# Patient Record
Sex: MALE | Race: WHITE | Employment: FULL TIME | URBAN - METROPOLITAN AREA
[De-identification: names, ages, dates, MRNs, and addresses within clinical notes are randomized per-mention and may not be internally consistent; named-entity substitution may affect disease eponyms.]

---

## 2017-06-13 ENCOUNTER — HOSPITAL ENCOUNTER (EMERGENCY)
Facility: CLINIC | Age: 52
Discharge: HOME | End: 2017-06-13
Attending: PHYSICIAN ASSISTANT | Admitting: PHYSICIAN ASSISTANT
Payer: COMMERCIAL

## 2017-06-13 VITALS
HEART RATE: 80 BPM | OXYGEN SATURATION: 98 % | BODY MASS INDEX: 32.98 KG/M2 | TEMPERATURE: 97.6 F | RESPIRATION RATE: 16 BRPM | SYSTOLIC BLOOD PRESSURE: 117 MMHG | DIASTOLIC BLOOD PRESSURE: 99 MMHG | WEIGHT: 250 LBS

## 2017-06-13 DIAGNOSIS — G47.9 DIFFICULTY SLEEPING: ICD-10-CM

## 2017-06-13 PROCEDURE — 99282 EMERGENCY DEPT VISIT SF MDM: CPT | Performed by: PHYSICIAN ASSISTANT

## 2017-06-13 PROCEDURE — 99283 EMERGENCY DEPT VISIT LOW MDM: CPT | Mod: Z6 | Performed by: PHYSICIAN ASSISTANT

## 2017-06-13 ASSESSMENT — ENCOUNTER SYMPTOMS
HEADACHES: 0
FEVER: 0
CHILLS: 0
NERVOUS/ANXIOUS: 0
DYSPHORIC MOOD: 0
COUGH: 0
SEIZURES: 1
SHORTNESS OF BREATH: 0
SLEEP DISTURBANCE: 1

## 2017-06-13 NOTE — ED AVS SNAPSHOT
[facility] Emergency Department    [address] [place] [address]    Phone:  [phone]    Fax:  [phone]                                       Bijan Livingston   MRN: [id number]    Department:  [facility] Emergency Department   Date of Visit:  6/13/2017           After Visit Summary Signature Page     I have received my discharge instructions, and my questions have been answered. I have discussed any challenges I see with this plan with the nurse or doctor.    ..........................................................................................................................................  Patient/Patient Representative Signature      ..........................................................................................................................................  Patient Representative Print Name and Relationship to Patient    ..................................................               ................................................  Date                                            Time    ..........................................................................................................................................  Reviewed by Signature/Title    ...................................................              ..............................................  Date                                                            Time

## 2017-06-13 NOTE — ED AVS SNAPSHOT
[facility] Emergency Department    [address] [place] [address]    Phone:  [phone]    Fax:  [phone]                                       Bijan Livingston   MRN: [id number]    Department:  [facility] Emergency Department   Date of Visit:  6/13/2017           Patient Information     Date Of Birth          1965        Your diagnoses for this visit were:     Difficulty sleeping        You were seen by Claudia Livingston PA-C.      Follow-up Information     Follow up with [facility] Emergency Department.    Specialty:  EMERGENCY MEDICINE    Why:  If symptoms worsen    Contact information:    [facility]   [place] [address] [phone]    Additional information:    From [address]: Exit at [address] on south side of [place]. Turn right on [address]. Turn left at stoplight on [address]. [facility] will be in view two blocks ahead        Discharge Instructions       Follow up on Friday with your primary care provider as scheduled to discuss your difficulty sleeping. Try the doxylamine for the next couple nights and see if that helps you sleep. Return to the ED as discussed for worsening symptoms.    Thank you for choosing [facility]'s Emergency Department. It was a pleasure taking care of you today. If you have any questions, please call [phone].    Claudia Livingston PA-C      Discharge References/Attachments     INSOMNIA (ENGLISH)      24 Hour Appointment Hotline       To make an appointment at any [place] clinic, call [phone] ([phone]). If you don't have a family doctor or clinic, we will help you find one. [place] clinics are conveniently located to serve the needs of you and your family.             Review of your medicines      START taking        Dose / Directions Last dose taken    doxylamine 25 MG Tabs tablet   Commonly known as:  UNISOM   Dose:  25 mg   Quantity:  14 each        Take 1  tablet (25 mg) by mouth At Bedtime   Refills:  0          Our records show that you are taking the medicines listed below. If these are incorrect, please call your family doctor or clinic.        Dose / Directions Last dose taken    DILANTIN 100 MG CR capsule   Generic drug:  phenytoin        5 caps every am   Refills:  0                Prescriptions were sent or printed at these locations (1 Prescription)                   [facility]   [address] [address]    Telephone:  [phone]   Fax:  [phone]   Hours:                  These medications are not ready yet because we are checking if your insurance will help you pay for them. Call your pharmacy to confirm that your medication is ready for pickup. It may take up to 24 hours for them to receive the prescription. If the prescription is not ready within 3 business days, please contact your clinic or your provider (1 of 1)         doxylamine (UNISOM) 25 MG TABS tablet                Orders Needing Specimen Collection     None      Pending Results     No orders found from 6/11/2017 to 6/14/2017.            Pending Culture Results     No orders found from 6/11/2017 to 6/14/2017.            Pending Results Instructions     If you had any lab results that were not finalized at the time of your Discharge, you can call the ED Lab Result RN at [phone]. You will be contacted by this team for any positive Lab results or changes in treatment. The nurses are available 7 days a week from 10A to 6:30P.  You can leave a message 24 hours per day and they will return your call.        Thank you for choosing [facility]       Thank you for choosing [facility] for your care. Our goal is always to provide you with excellent care. Hearing back from our patients is one way we can continue to improve our services. Please take a few minutes to complete the written survey that you may receive in the mail after you visit with us. Thank  "you!        [facility] Information     [employer] lets you send messages to your doctor, view your test results, renew your prescriptions, schedule appointments and more. To sign up, go to [email or web address] . Click on \"Log in\" on the left side of the screen, which will take you to the Welcome page. Then click on \"Sign up Now\" on the right side of the page.     You will be asked to enter the access code listed below, as well as some personal information. Please follow the directions to create your username and password.     Your access code is: [email or web address]  Expires: 2017 10:19 PM     Your access code will  in 90 days. If you need help or a new code, please call your [facility] clinic or [phone].        Care EveryWhere ID     This is your Care EveryWhere ID. This could be used by other organizations to access your [facility] medical records  [id number]        After Visit Summary       This is your record. Keep this with you and show to your community pharmacist(s) and doctor(s) at your next visit.                  "

## 2017-06-14 NOTE — ED PROVIDER NOTES
History     Chief Complaint   Patient presents with     Insomnia     HPI  Bijan Livingston is a 51 year old male who presents to the emergency department complaining of difficulty sleeping.  This has been ongoing for the past week due to the hot weather.  He reports tossing and turning throughout the night but not getting comfortable due to feeling too hot.  He has gotten about 2 hours of sleep per night. He did have 2 nights where he slept okay.  He has tried half tablets of melatonin 3mg which doesn't seem to help anymore. he denies any pain, fever, infectious symptoms.  Does have a lot of his mind related to work but denies any significant anxiety or depression contributing. He is taking Dilantin for seizure history but otherwise no other medications.  Denies drug use or cigarette use. No alcohol use.      I have reviewed the Medications, Allergies, Past Medical and Surgical History, and Social History in the Epic system.    Allergies: No Known Allergies      No current facility-administered medications on file prior to encounter.   Current Outpatient Prescriptions on File Prior to Encounter:  DILANTIN 100 MG OR CAPS 5 caps every am       Patient Active Problem List   Diagnosis     Hyperlipidemia with target LDL less than 130     Obesity     Possible exposure to STD     Non compliance with medical treatment     Snoring     Sleep disorder     Epilepsy (H)     Adjustment disorder with anxious mood       Past Surgical History:   Procedure Laterality Date     LAPAROSCOPIC APPENDECTOMY  8/29/2011    Procedure:LAPAROSCOPIC APPENDECTOMY; Laparoscopic Appendectomy; Surgeon:ALLYN MCCORMICK; Location:[facility] OR     LOBECTOMY LUNG      Right, upper 1/3       Social History   Substance Use Topics     Smoking status: Former Smoker     Packs/day: 0.00     Quit date: 5/29/2010     Smokeless tobacco: Never Used     Alcohol use Yes      Comment: 1-2/month         There is no immunization history on file for this patient.    BMI:  "Estimated body mass index is 32.98 kg/(m^2) as calculated from the following:    Height as of 10/22/15: 1.854 m (6' 1\").    Weight as of this encounter: 113.4 kg (250 lb).      Review of Systems   Constitutional: Negative for chills and fever.   Respiratory: Negative for cough and shortness of breath.    Skin: Negative for rash.   Neurological: Positive for seizures (hx seizures, none for many years). Negative for headaches.   Psychiatric/Behavioral: Positive for sleep disturbance. Negative for dysphoric mood and suicidal ideas. The patient is not nervous/anxious.    All other systems reviewed and are negative.      Physical Exam   BP: (!) 117/99  Pulse: 80  Temp: 97.6  F (36.4  C)  Resp: 16  Weight: 113.4 kg (250 lb)  SpO2: 98 %  Physical Exam   Constitutional: He is oriented to person, place, and time. He appears well-developed and well-nourished. No distress.   HENT:   Head: Normocephalic and atraumatic.   Right Ear: Tympanic membrane normal.   Left Ear: Tympanic membrane normal.   Mouth/Throat: Uvula is midline, oropharynx is clear and moist and mucous membranes are normal.   Eyes: Conjunctivae and EOM are normal. Pupils are equal, round, and reactive to light.   Neck: Normal range of motion.   Cardiovascular: Normal rate, regular rhythm and normal heart sounds.    Pulmonary/Chest: Effort normal and breath sounds normal. No respiratory distress.   Neurological: He is alert and oriented to person, place, and time. No cranial nerve deficit.   Skin: Skin is warm and dry. He is not diaphoretic.   Psychiatric: He has a normal mood and affect.   Nursing note and vitals reviewed.      ED Course     ED Course     Procedures  None     Assessments & Plan (with Medical Decision Making)  Bijan Livingston is a 51 year old male who presented to the emergency department complaining of difficulty sleeping for the past week. He attributes it to the change in the weather. Tried melatonin without significant relief. Denies any " other symptoms contributing. Vitals and exam here unremarkable. I did not feel work up in ED warranted given normal exam. I discussed with him treatment options and he has not tried any over-the-counter sleep aids other than melatonin.  Rather than prescribing something stronger like Ambien or Trazodone this evening I recommended he try Unisom first, to which he was agreeable.  He was prescribed this as most pharmacies are currently closed. I recommended he take this over the next few nights and he already has a follow-up appointment scheduled on Friday with his PCP to discuss his difficulty sleeping further.  If the Unisom is working at that time he could continue it but if not they can look at other options.  He was given instructions on when to return to the emergency department.  All questions were answered and patient was comfortable with this plan and was discharged at this time.       I have reviewed the nursing notes.    I have reviewed the findings, diagnosis, plan and need for follow up with the patient.    Discharge Medication List as of 6/13/2017 10:19 PM      START taking these medications    Details   doxylamine (UNISOM) 25 MG TABS tablet Take 1 tablet (25 mg) by mouth At Bedtime, Disp-14 each, R-0, E-Prescribe             Final diagnoses:   Difficulty sleeping       6/13/2017   [facility] EMERGENCY DEPARTMENT     Claudia Livingtson PA-C  06/13/17 9054

## 2017-06-14 NOTE — DISCHARGE INSTRUCTIONS
Follow up on Friday with your primary care provider as scheduled to discuss your difficulty sleeping. Try the doxylamine for the next couple nights and see if that helps you sleep. Return to the ED as discussed for worsening symptoms.    Thank you for choosing [facility]'s Emergency Department. It was a pleasure taking care of you today. If you have any questions, please call [phone].    Claudia Livingston PA-C

## 2017-06-16 ENCOUNTER — TRANSFERRED RECORDS (OUTPATIENT)
Dept: HEALTH INFORMATION MANAGEMENT | Facility: CLINIC | Age: 52
End: 2017-06-16

## 2017-06-29 ENCOUNTER — TRANSFERRED RECORDS (OUTPATIENT)
Dept: HEALTH INFORMATION MANAGEMENT | Facility: CLINIC | Age: 52
End: 2017-06-29

## 2017-07-03 ENCOUNTER — NURSE TRIAGE (OUTPATIENT)
Dept: NURSING | Facility: CLINIC | Age: 52
End: 2017-07-03

## 2017-07-04 NOTE — TELEPHONE ENCOUNTER
"Bijan prescribed Doxycycline from his PCP at [facility] in [place].  Took first dose at 17:30pm, has a mild headache.  Per information in drug insert packaging, he was advised to call a \"doctor\" for a headache, indicative of increased intracranial pressure.  Advised if headache becomes severe, or other signs including lethargy, seizures, vomiting, vision changes to call back or call prescriber.        Additional Information    Negative: Drug overdose and nurse unable to answer question    Negative: Caller requesting information not related to medicine    Negative: Caller requesting a prescription for Strep throat and has a positive culture result    Negative: Rash while taking a medication or within 3 days of stopping it    Negative: Immunization reaction suspected    Negative: [1] Asthma and [2] having symptoms of asthma (cough, wheezing, etc)    Caller has medication question, adult has minor symptoms, caller declines triage, and triager answers question    Protocols used: MEDICATION QUESTION CALL-ADULT-    "

## 2017-07-23 ENCOUNTER — HOSPITAL ENCOUNTER (EMERGENCY)
Facility: CLINIC | Age: 52
Discharge: HOME | End: 2017-07-24
Attending: PHYSICIAN ASSISTANT | Admitting: PHYSICIAN ASSISTANT
Payer: COMMERCIAL

## 2017-07-23 DIAGNOSIS — R20.8 FEELING OF WARMNESS: ICD-10-CM

## 2017-07-23 DIAGNOSIS — L74.0 HEAT RASH: ICD-10-CM

## 2017-07-23 PROCEDURE — 99282 EMERGENCY DEPT VISIT SF MDM: CPT | Mod: Z6 | Performed by: PHYSICIAN ASSISTANT

## 2017-07-23 PROCEDURE — 99282 EMERGENCY DEPT VISIT SF MDM: CPT | Performed by: PHYSICIAN ASSISTANT

## 2017-07-23 NOTE — ED AVS SNAPSHOT
[facility] Emergency Department    [address] [place] [address]    Phone:  [phone]    Fax:  [phone]                                       Bijan Livingston   MRN: [id number]    Department:  [facility] Emergency Department   Date of Visit:  7/23/2017           Patient Information     Date Of Birth          1965        Your diagnoses for this visit were:     Feeling of warmness     Heat rash        You were seen by Claudia Livingston PA-C.      Follow-up Information     Follow up with [facility] Emergency Department.    Specialty:  EMERGENCY MEDICINE    Why:  If symptoms worsen    Contact information:    [address] [facility]   [place] [address] [phone]    Additional information:    From [address]: Exit at [address] on south side of [place]. Turn right on [address]. Turn left at stoplight on [address]. [facility] will be in view two blocks ahead        Discharge Instructions       Take 25 mg benadryl when you get home. This should help you sleep and help your rash as well. Follow up tomorrow at your scheduled appointment for reevaluation. Return to the emergency department for worsening symptoms.    Thank you for choosing [facility]'s Emergency Department. It was a pleasure taking care of you today. If you have any questions, please call [phone].    Claudia Livingston PA-C      Discharge References/Attachments     SKIN RASHES, SELF-CARE FOR (ENGLISH)      24 Hour Appointment Hotline       To make an appointment at any [place] clinic, call [phone] ([phone]). If you don't have a family doctor or clinic, we will help you find one. [facility] clinics are conveniently located to serve the needs of you and your family.             Review of your medicines      Our records show that you are taking the medicines listed below. If these are incorrect, please call your family doctor or clinic.        Dose /  "Directions Last dose taken    DILANTIN 100 MG CR capsule   Generic drug:  phenytoin        5 caps every am   Refills:  0        doxylamine 25 MG Tabs tablet   Commonly known as:  UNISOM   Dose:  25 mg   Quantity:  14 each        Take 1 tablet (25 mg) by mouth At Bedtime   Refills:  0                Orders Needing Specimen Collection     None      Pending Results     No orders found for last 3 day(s).            Pending Culture Results     No orders found for last 3 day(s).            Pending Results Instructions     If you had any lab results that were not finalized at the time of your Discharge, you can call the ED Lab Result RN at [phone]. You will be contacted by this team for any positive Lab results or changes in treatment. The nurses are available 7 days a week from 10A to 6:30P.  You can leave a message 24 hours per day and they will return your call.        Thank you for choosing [facility]       Thank you for choosing [facility] for your care. Our goal is always to provide you with excellent care. Hearing back from our patients is one way we can continue to improve our services. Please take a few minutes to complete the written survey that you may receive in the mail after you visit with us. Thank you!        [employer] Information     [employer] lets you send messages to your doctor, view your test results, renew your prescriptions, schedule appointments and more. To sign up, go to [email or web address] . Click on \"Log in\" on the left side of the screen, which will take you to the Welcome page. Then click on \"Sign up Now\" on the right side of the page.     You will be asked to enter the access code listed below, as well as some personal information. Please follow the directions to create your username and password.     Your access code is: [email or web address]  Expires: 2017 10:19 PM     Your access code will  in 90 days. If you need help or a new code, please call your [facility] clinic or [phone].   "      Care EveryWhere ID     This is your Care EveryWhere ID. This could be used by other organizations to access your [facility] medical records  [id number]        Equal Access to Services     EVE KEMP : Anne Tong, gal pollack, rigoberto owens, valencia carlton. So [facility] [phone].    ATENCIÓN: Si habla español, tiene a diaz disposición servicios gratuitos de asistencia lingüística. Llame al [phone].    We comply with applicable federal civil rights laws and [place] laws. We do not discriminate on the basis of race, color, national origin, age, disability sex, sexual orientation or gender identity.            After Visit Summary       This is your record. Keep this with you and show to your community pharmacist(s) and doctor(s) at your next visit.

## 2017-07-23 NOTE — ED AVS SNAPSHOT
[facility] Emergency Department    [address] [place] [address]    Phone:  [phone]    Fax:  [phone]                                       Bijan Livingston   MRN: [id number]    Department:  [facility] Emergency Department   Date of Visit:  7/23/2017           After Visit Summary Signature Page     I have received my discharge instructions, and my questions have been answered. I have discussed any challenges I see with this plan with the nurse or doctor.    ..........................................................................................................................................  Patient/Patient Representative Signature      ..........................................................................................................................................  Patient Representative Print Name and Relationship to Patient    ..................................................               ................................................  Date                                            Time    ..........................................................................................................................................  Reviewed by Signature/Title    ...................................................              ..............................................  Date                                                            Time

## 2017-07-24 ENCOUNTER — TRANSFERRED RECORDS (OUTPATIENT)
Dept: HEALTH INFORMATION MANAGEMENT | Facility: CLINIC | Age: 52
End: 2017-07-24

## 2017-07-24 VITALS
TEMPERATURE: 97.1 F | RESPIRATION RATE: 20 BRPM | HEART RATE: 78 BPM | DIASTOLIC BLOOD PRESSURE: 70 MMHG | SYSTOLIC BLOOD PRESSURE: 138 MMHG | OXYGEN SATURATION: 99 %

## 2017-07-24 ASSESSMENT — ENCOUNTER SYMPTOMS
HEADACHES: 0
DIAPHORESIS: 0
SHORTNESS OF BREATH: 0
MYALGIAS: 0
ABDOMINAL PAIN: 0
FEVER: 0
VOMITING: 0
CHILLS: 0
DIARRHEA: 0

## 2017-07-24 NOTE — ED PROVIDER NOTES
History     Chief Complaint   Patient presents with     Fever     HPI  Bijan Livingston is a 51 year old male who presents to the emergency department complaining of feeling hot. He was laying down in his basement tonight trying to go to sleep. He developed a sensation of warmth that was quite uncomfortable, so he decided to come to the ER. He denies feeling feverish, just very hot. No sweating associated with it. By the time he go here it was resolved, but he noticed a mildly pruritic rash to his back. Denies pain, body aches, cough, vomiting, diarrhea, chest pain, shortness of breath. Having ongoing issues with chronic epididymitis. Is seeing his PCP tomorrow regarding this.    I have reviewed the Medications, Allergies, Past Medical and Surgical History, and Social History in the Epic system.    Allergies: No Known Allergies      No current facility-administered medications on file prior to encounter.   Current Outpatient Prescriptions on File Prior to Encounter:  doxylamine (UNISOM) 25 MG TABS tablet Take 1 tablet (25 mg) by mouth At Bedtime   DILANTIN 100 MG OR CAPS 5 caps every am       Patient Active Problem List   Diagnosis     Hyperlipidemia with target LDL less than 130     Obesity     Possible exposure to STD     Non compliance with medical treatment     Snoring     Sleep disorder     Epilepsy (H)     Adjustment disorder with anxious mood       Past Surgical History:   Procedure Laterality Date     LAPAROSCOPIC APPENDECTOMY  8/29/2011    Procedure:LAPAROSCOPIC APPENDECTOMY; Laparoscopic Appendectomy; Surgeon:ALLYN MCCORMICK; Location:[facility]     LOBECTOMY LUNG      Right, upper 1/3       Social History   Substance Use Topics     Smoking status: Former Smoker     Packs/day: 0.00     Quit date: 5/29/2010     Smokeless tobacco: Never Used     Alcohol use Yes      Comment: 1-2/month         There is no immunization history on file for this patient.    BMI: Estimated body mass index is 32.98 kg/(m^2) as  "calculated from the following:    Height as of 10/22/15: 1.854 m (6' 1\").    Weight as of 6/13/17: 113.4 kg (250 lb).      Review of Systems   Constitutional: Negative for chills, diaphoresis and fever (felt warm, but not feverish).   Respiratory: Negative for shortness of breath.    Cardiovascular: Negative for chest pain.   Gastrointestinal: Negative for abdominal pain, diarrhea and vomiting.   Genitourinary: Positive for testicular pain (chronic).   Musculoskeletal: Negative for myalgias.   Skin: Positive for rash.   Neurological: Negative for headaches.   All other systems reviewed and are negative.      Physical Exam   BP: 138/70  Pulse: 78  Temp: 97.1  F (36.2  C)  Resp: 20  SpO2: 99 %  Physical Exam   Constitutional: He is oriented to person, place, and time. He appears well-developed and well-nourished. No distress.   HENT:   Head: Atraumatic.   Mouth/Throat: Oropharynx is clear and moist. No oropharyngeal exudate.   Eyes: Conjunctivae and EOM are normal. Pupils are equal, round, and reactive to light. No scleral icterus.   Cardiovascular: Normal rate, regular rhythm, normal heart sounds and intact distal pulses.    Pulmonary/Chest: Effort normal and breath sounds normal. No respiratory distress.   Abdominal: Soft. Bowel sounds are normal. There is no tenderness.   Musculoskeletal: He exhibits no edema or tenderness.   Neurological: He is alert and oriented to person, place, and time.   Skin: Skin is warm and dry. Rash (faintly erythematous papular rash across low-mid back, nontender) noted. He is not diaphoretic.   Psychiatric: He has a normal mood and affect.   Nursing note and vitals reviewed.      ED Course     ED Course     Procedures  None     Assessments & Plan (with Medical Decision Making)  Bijan Livingston is a 51 year old male who presented to the emergency department complaining of feeling hot while attempting to sleep tonight. He had no other associated symptoms and it had resolved by the time he " arrived to the emergency department.  He says it didn't feel like a fever.  Also noted a rash to his back on arrival.  Otherwise has no other symptoms other than chronic testicular pain for which he is being seen by his PCP tomorrow.  His vital signs on arrival were within normal limits with a temperature of 97.1F. He had a faintly erythematous papular rash to mid/low back area that was nontender to touch without drainage.  Rest of exam was normal.  It looks like the rash is consistent with a heat rash.  I explained this diagnosis to the patient.  Cause of his sensation of feeling hot is unclear but I don't think it is anything serious at this time.  I recommended he try that benadryl for the heat rash tonight and it will also help him get to sleep.  He should monitor his symptoms and discuss this ED visit with his PCP tomorrow.  He was advised to return to the ED if symptoms worsen.  Patient agreeable to discharge at this time.       I have reviewed the nursing notes.    I have reviewed the findings, diagnosis, plan and need for follow up with the patient.      New Prescriptions    No medications on file       Final diagnoses:   Feeling of warmness   Heat rash       7/23/2017   [facility] EMERGENCY DEPARTMENT     Claudia Livingston PA-C  07/24/17 0030

## 2017-07-24 NOTE — ED NOTES
Pt states he feels hot, didn't check his temp. Then pt points to his R side back area that hurts, yet denies pain. Pt then states he hasn't felt well for 2 years. He might have to have his R testicle removed, unsure about this.

## 2017-07-24 NOTE — DISCHARGE INSTRUCTIONS
Take 25 mg benadryl when you get home. This should help you sleep and help your rash as well. Follow up tomorrow at your scheduled appointment for reevaluation. Return to the emergency department for worsening symptoms.    Thank you for choosing [facility]'s Emergency Department. It was a pleasure taking care of you today. If you have any questions, please call [phone].    Claudia Livingston PA-C

## 2017-07-26 ENCOUNTER — NURSE TRIAGE (OUTPATIENT)
Dept: NURSING | Facility: CLINIC | Age: 52
End: 2017-07-26

## 2017-07-27 NOTE — TELEPHONE ENCOUNTER
Reason for Disposition    Pain or burning with passing urine    Additional Information    Negative: Followed a genital area injury    Negative: Pain or burning with passing urine is main symptom    Negative: Pain in scrotum or testicle is main symptom    Negative: Swollen scrotum OR lump in the scrotum/groin area    Negative: Pubic lice suspected    Negative: [1] Blood from end of penis AND [2] large amount    Negative: [1] Not circumcised AND [2] foreskin pulled back and stuck    Negative: [1] Looks infected (e.g., draining sore, ulcer, rash is painful to touch) AND [2] fever > 100.5 F (38.1 C)    Negative: [1] Unable to urinate (or only a few drops) > 4 hours AND     [2] bladder feels very full (e.g., palpable bladder or strong urge to urinate)    Negative: [1] Erection AND [2] present > 4 hours    Negative: Patient sounds very sick or weak to the triager    Negative: [1] Pus or bloody discharge from the end of the penis AND [2] fever    Negative: [1] Pain or burning with passing urine AND [2] fever > 100.5 F (38.1 C)    Negative: [1] Pain or burning with passing urine AND [2] side (flank) or back pain present    Negative: Entire penis is swollen (i.e., edema)    Negative: [1] Antibiotic treatment > 3 days for STD (e.g., penile discharge from gonorrhea, chlamydia)    AND [2] painful urination not improved    Negative: Pus (white, yellow) or bloody discharge from end of penis    Protocols used: PENIS AND SCROTUM SYMPTOMS-ADULT-  Patient is calling and states his penis hurts and is preventing him from sleeping. Patient took  Tylenol for discomfort  with no relief. Triager advised patient to be seen in the ED for evaluation.

## 2017-07-28 ENCOUNTER — RADIANT APPOINTMENT (OUTPATIENT)
Dept: GENERAL RADIOLOGY | Facility: CLINIC | Age: 52
End: 2017-07-28

## 2017-07-28 ENCOUNTER — OFFICE VISIT (OUTPATIENT)
Dept: URGENT CARE | Facility: URGENT CARE | Age: 52
End: 2017-07-28

## 2017-07-28 VITALS
HEART RATE: 100 BPM | SYSTOLIC BLOOD PRESSURE: 133 MMHG | BODY MASS INDEX: 32.59 KG/M2 | DIASTOLIC BLOOD PRESSURE: 78 MMHG | WEIGHT: 247 LBS | TEMPERATURE: 99.1 F

## 2017-07-28 DIAGNOSIS — S61.217A LACERATION OF LEFT LITTLE FINGER WITHOUT FOREIGN BODY WITHOUT DAMAGE TO NAIL, INITIAL ENCOUNTER: ICD-10-CM

## 2017-07-28 DIAGNOSIS — S69.92XA FINGER INJURY, LEFT, INITIAL ENCOUNTER: Primary | ICD-10-CM

## 2017-07-28 DIAGNOSIS — S69.92XA FINGER INJURY, LEFT, INITIAL ENCOUNTER: ICD-10-CM

## 2017-07-28 PROCEDURE — 12002 RPR S/N/AX/GEN/TRNK2.6-7.5CM: CPT | Performed by: PHYSICIAN ASSISTANT

## 2017-07-28 PROCEDURE — 73140 X-RAY EXAM OF FINGER(S): CPT | Mod: LT

## 2017-07-28 RX ORDER — TRAZODONE HYDROCHLORIDE 50 MG/1
TABLET, FILM COATED ORAL
Refills: 1 | COMMUNITY
Start: 2017-07-15 | End: 2017-08-23

## 2017-07-28 RX ORDER — DOXYCYCLINE 100 MG/1
CAPSULE ORAL
Refills: 0 | COMMUNITY
Start: 2017-07-24 | End: 2017-08-23

## 2017-07-28 NOTE — PROGRESS NOTES
SUBJECTIVE:                                                    Bijan Livingston is a 51 year old male who presents to clinic today for the following health issues:      Laceration      Duration: Today    Description (location/character/radiation): Left pinky finger    Intensity:  moderate    Accompanying signs and symptoms: none    History (similar episodes/previous evaluation): None    Precipitating or alleviating factors: None    Therapies tried and outcome: None      smashed finger b/w a gas tank and truck bed        .      Last tetanus booster within 5 years: yes     No Known Allergies    Past Medical History:   Diagnosis Date     Adjustment disorder with anxious mood 5/22/2015     Epilepsy (H) 5/22/2015     Hyperlipidemia LDL goal < 130 2/2/2015     Obesity 2/2/2015     Seizures (H)     Last one was mid-1980's     Sleep disorder 5/22/2015     Snoring 5/22/2015         Current Outpatient Prescriptions on File Prior to Visit:  doxylamine (UNISOM) 25 MG TABS tablet Take 1 tablet (25 mg) by mouth At Bedtime   DILANTIN 100 MG OR CAPS 5 caps every am     No current facility-administered medications on file prior to visit.       ROS:  SKIN: as above  Musculoskeletal: as above    OBJECTIVE:  Blood pressure 133/78, pulse 100, temperature 99.1  F (37.3  C), temperature source Oral, weight 247 lb (112 kg).     The patient appears today in no acute distress.  Laceration LOCATION: left 5th montanez, , from DIPJ to PIPJ, and another 1 cm lac adjacent to this laterally  Size of laceration: 3 centimeters  Characteristics of the laceration: clean and extends into subcutaneous fat, larger one is jagged, small one is straight,   Tendon function intact: yes  Sensation to light touch intact: yes   Cap refill intact.  Wound clean. No FBs.      XR NEG    Assessment:    ICD-10-CM    1. Finger injury, left, initial encounter S69.92XA XR Finger Left G/E 2 Views   2. Laceration of left little finger without foreign body without damage to  nail, initial encounter S61.217A REPAIR SUPERFICIAL, WOUND BODY < =2.5CM       PLAN:  Oral consent received.    Wound was locally injected with 3 cc's of Lidocaine 1% plain, good anesthesia achieved.    Wound cleaned with saline. No FBs.    Larger Laceration was closed using 4 4-0 nylon interrupted sutures, smaller wit karen suture  Bacitracin dressing and finger  splint applied.  Patient tolerated procedure well.      Ty Nayak PA-C

## 2017-07-28 NOTE — MR AVS SNAPSHOT
After Visit Summary   7/28/2017    Bijan Livingston    MRN: [id number]           Patient Information     Date Of Birth          1965        Visit Information        Provider Department      7/28/2017 1:20 PM Abdi Nayak PA [facility] [address]        Today's Diagnoses     Finger injury, left, initial encounter    -  1    Laceration of left little finger without foreign body without damage to nail, initial encounter          Care Instructions    Sutures need to be removed in 10 days. Keep wound dry. Return to clinic or Emergency Department for redness spreading from wound, pus drainage or fevers. Change bacitracin dressings daily or when soiled  *LACERATION (All Closures)  A laceration is a cut through the skin. This will usually require stitches (sutures) or staples if it is deep. Minor cuts may be treated with a tape closure ( Steri-Strips ) or Dermabond skin glue  HOME CARE  PAIN MEDICINE: You may use acetaminophen (Tylenol) 650-1000 mg every 6 hours or ibuprofen (Motrin, Advil) 600 mg every 6-8 hours with food to control pain, if you are able to take these medicines. [NOTE: If you have chronic liver or kidney disease or ever had a stomach ulcer or GI bleeding, talk with your doctor before using these medicines.]  EXTREMITY, FACE or TRUNK WOUNDS:  Keep the wound clean and dry. If a bandage was applied and it becomes wet or dirty, replace it. Otherwise, leave it in place for the first 24 hours.  If stitches or staples were used, clean the wound daily. Protect the wound from sunlight and tanning lamps.  After removing the bandage, wash the area with soap and water. Use a wet cotton swab (Q tip) to loosen and remove any blood or crust that forms.  After cleaning, apply a thin layer of Polysporin or Bacitracin ointment. This will keep the wound clean and make it easier to remove the stitches or staples. Reapply a fresh bandage.  You may remove the bandage to shower as usual  after the first 24 hours, but do not soak the area in water (no swimming) until the stitches or staples are removed.  If Steri-Strips were used, keep the area clean and dry. If it becomes wet, blot it dry with a towel. It is okay to take a brief shower, but avoid scrubbing the area.  If Dermabond skin adhesive was used, do not scratch, rub or pick at the adhesive film. Do not place tape directly over the film. Do not apply liquid, ointment or creams to the wound while the film is in place. Do not clean the wound with peroxide and do not apply ointments. Avoid activities that cause heavy sweating until the film has fallen off. Protect the wound from prolonged exposure to sunlight , tanning lamps, or water. Keep it completely dry for 24 hours and  Do not soak the wound in water (no baths or swimming) x 5 days, then begin washing as normal and the film will fall off by itself in 5-10 days.  SCALP WOUNDS: During the first two days, you may carefully rinse your hair in the shower to remove blood, glass or dirt particles. After two days, you may shower and shampoo your hair normally. Do not soak your scalp in the tub or go swimming until the stitches or staples have been removed.  MOUTH WOUNDS: Eat soft foods to reduce pain. If the cut is inside of your mouth, clean by rinsing after each meal and at bedtime with a mixture of equal parts water and Hydrogen Peroxide (do not swallow!). Or, you can use a cotton swab to directly apply Hydrogen Peroxide onto the cut.  FOLLOW UP: Most skin wounds heal within ten days. Mouth and facial wounds heal within five days. However, even with proper treatment, a wound infection may sometimes occur. Therefore, you should check the wound daily for signs of infection listed below.  Stitches should be removed from the face within five days; stitches and staples should be removed from other parts of the body within 7-10 days. Unless you are told to come back to the emergency room, you may have  your doctor or urgent care remove the stitches. If dissolving stitches were used in the mouth, these will fall out or dissolve without the need for removal. If tape closures ( Steri-Strips ) were used, remove them yourself if they have not fallen off after 7 days. If Dermabond skin glue was used, the film will fall off by itself in 5-10 days.   GET PROMPT MEDICAL ATTENTION if any of the following occur:  Increasing pain in the wound  Redness, swelling or pus coming from the wound  Fever over 101 F (38.3 C) oral  If stitches or staples come apart or fall out or if Steri-Strips fall off before seven days  If the wound edges re-open  Bleeding not controlled by direct pressure    [id number] [facility], [address] [place] [address], [place] [address]. All rights reserved. This information is not intended as a substitute for professional medical care. Always follow your healthcare professional's instructions              Follow-ups after your visit        Follow-up notes from your care team     Return if symptoms worsen or fail to improve.      Who to contact     If you have questions or need follow up information about today's clinic visit or your schedule please contact [facility] directly at [phone].  Normal or non-critical lab and imaging results will be communicated to you by MyChart, letter or phone within 4 business days after the clinic has received the results. If you do not hear from us within 7 days, please contact the clinic through MyChart or phone. If you have a critical or abnormal lab result, we will notify you by phone as soon as possible.  Submit refill requests through [employer] or call your pharmacy and they will forward the refill request to us. Please allow 3 business days for your refill to be completed.          Additional Information About Your Visit        [employer] Information     [employer] lets you send messages to your doctor, view your test results, renew your prescriptions,  "schedule appointments and more. To sign up, go to [email or web address] . Click on \"Log in\" on the left side of the screen, which will take you to the Welcome page. Then click on \"Sign up Now\" on the right side of the page.     You will be asked to enter the access code listed below, as well as some personal information. Please follow the directions to create your username and password.     Your access code is: [email or web address]  Expires: 2017 10:19 PM     Your access code will  in 90 days. If you need help or a new code, please call your [place] clinic or [phone].        Care EveryWhere ID     This is your Care EveryWhere ID. This could be used by other organizations to access your [facility] medical records  [id number]        Your Vitals Were     Pulse Temperature BMI (Body Mass Index)             100 99.1  F (37.3  C) (Oral) 32.59 kg/m2          Blood Pressure from Last 3 Encounters:   17 133/78   17 138/70   17 (!) 117/99    Weight from Last 3 Encounters:   17 247 lb (112 kg)   17 250 lb (113.4 kg)   10/22/15 250 lb (113.4 kg)              We Performed the Following     REPAIR SUPERFICIAL, WOUND BODY 2.6-7.5 CM        Primary Care Provider Office Phone # Fax #    [facility] [phone]       [address] [facility] [address]        Equal Access to Services     SHANA KEMP : Hadii theo ku hadasho Sospenserali, waaxda luqadaha, qaybta kaalmada adeegyada, valencia carlton. So [facility] [phone].    ATENCIÓN: Si habla español, tiene a diaz disposición servicios gratuitos de asistencia lingüística. Llame al [phone].    We comply with applicable federal civil rights laws and [place] laws. We do not discriminate on the basis of race, color, national origin, age, disability sex, sexual orientation or gender identity.            Thank you!     Thank you for choosing [facility]  for your care. Our goal is always to " provide you with excellent care. Hearing back from our patients is one way we can continue to improve our services. Please take a few minutes to complete the written survey that you may receive in the mail after your visit with us. Thank you!             Your Updated Medication List - Protect others around you: Learn how to safely use, store and throw away your medicines at www.disposemymeds.org.          This list is accurate as of: 7/28/17  1:46 PM.  Always use your most recent med list.                   Brand Name Dispense Instructions for use Diagnosis    DILANTIN 100 MG CR capsule   Generic drug:  phenytoin      5 caps every am        doxycycline Monohydrate 100 MG Caps           doxylamine 25 MG Tabs tablet    UNISOM    14 each    Take 1 tablet (25 mg) by mouth At Bedtime        traZODone 50 MG tablet    DESYREL

## 2017-07-28 NOTE — NURSING NOTE
"Chief Complaint   Patient presents with     Work Comp     Finger laceration       Initial /78 (BP Location: Left arm, Patient Position: Chair, Cuff Size: Adult Regular)  Pulse 100  Temp 99.1  F (37.3  C) (Oral)  Wt 247 lb (112 kg)  BMI 32.59 kg/m2 Estimated body mass index is 32.59 kg/(m^2) as calculated from the following:    Height as of 10/22/15: 6' 1\" (1.854 m).    Weight as of this encounter: 247 lb (112 kg).  Medication Reconciliation: complete   Estevan Zavala MA        "

## 2017-08-03 ENCOUNTER — TRANSFERRED RECORDS (OUTPATIENT)
Dept: HEALTH INFORMATION MANAGEMENT | Facility: CLINIC | Age: 52
End: 2017-08-03

## 2017-08-05 ENCOUNTER — NURSE TRIAGE (OUTPATIENT)
Dept: NURSING | Facility: CLINIC | Age: 52
End: 2017-08-05

## 2017-08-05 NOTE — TELEPHONE ENCOUNTER
Caller has no symptoms, just to the antibiotic early and declined second opinion from poison control, gave home and recommendations for reminders and safe med storage and to continue medication for the remainder of his two weeks at 6 am versus 6 pm, no evening dose tonight. He agreed with plan.  Additional Information    Negative: Severe difficulty breathing (e.g., struggling for each breath, speaks in single words)    Negative: Bluish lips, tongue, or face now    Negative: Seizure    Negative: Difficult to awaken or acting confused  (e.g., disoriented, slurred speech)    Negative: Shock suspected (e.g., cold/pale/clammy skin, too weak to stand, low BP, rapid pulse)    Negative: [1] Intentional overdose AND [2] suicidal thoughts or ideas    Negative: Suicide attempt, known or suspected    Negative: Sounds like a life-threatening emergency to the triager    Negative: Inhalation of smoke or fumes    Negative: Carbon monoxide exposure, known or suspected    Negative: Chemical in the eye    Negative: Chemical on the skin    Negative: Swallowed a (non-poisonous) foreign body    Negative: [1] HARMFUL SUBSTANCE or ACID or ALKALI ingestion (e.g., toilet , drain , lye, Clinitest tablets, ammonia, bleaches) AND [2] any symptoms (e.g., mouth pain, sore throat, breathing difficulty)    Negative: [1] PETROLEUM PRODUCT ingestion (e.g., kerosene, gasoline, benzene, furniture polish, lighter fluid) AND [2] any symptoms (e.g., breathing difficulty, coughing, vomiting)    Negative: [1] Poison Center advised caller to go to ED AND [2] caller seeking second opinion    Negative: Patient sounds very sick or weak to the triager    Negative: [1] HARMFUL SUBSTANCE or ACID or ALKALI ingestion (e.g., toilet , drain , lye, Clinitest tablets, ammonia, bleaches) AND [2] NO symptoms    Negative: [1] PETROLEUM PRODUCT ingestion (e.g., kerosene, gasoline, benzene, furniture polish, lighter fluid) AND [2] NO  "symptoms    Negative: [1] DOUBLE DOSE (an extra dose or lesser amount) of over-the-counter (OTC) drug AND [2] any symptoms (e.g., dizziness, nausea, pain, sleepiness)    Negative: [1] DOUBLE DOSE (an extra dose or lesser amount) of prescription drug AND [2] any symptoms (e.g., dizziness, nausea, pain, sleepiness)    Negative: Mercury spill (e.g., broken glass thermometer, broken spiral CFL lightbulb)    Negative: Patient or caller provides unclear information about type or amount of substance    Negative: All OTHER POTENTIALLY HARMFUL SUBSTANCES (e.g., nearly all chemicals, plants, more than a double dose of a drug)    Negative: Triager unable to answer question    Negative: [1] DOUBLE DOSE (an extra dose or lesser amount) of prescription drug AND [2] NO symptoms    Negative: Diabetes drug error or overdose (e.g., insulin or extra dose)    Negative: Feces ingestion (e.g., patient with profound dementia or mental illness) (all triage questions negative)    Negative: HARMLESS SUBSTANCE (non-poisonous) ingestion (all triage questions negative)    Negative: [1] DOUBLE DOSE (an extra dose or lesser amount) of over-the-counter (OTC) drug AND [2] NO symptoms (all triage questions negative)    [1] DOUBLE DOSE (an extra dose or lesser amount) of antibiotic drug AND [2] NO symptoms (all triage questions negative)     Caller states that the is taking 2 different antibiotics for he penial pain and he has been taking Levoqin every 24 hours at 6 pm at night and then he is taking the metriozle antibiotic TID and he mixed up his pills this morning and accidentally just took his Levoquin at 6 am versus 6 pm so 12 hours early with dose.    Answer Assessment - Initial Assessment Questions  1. SUBSTANCE: \"What was swallowed?\" If necessary, have the caller look at the label on the container.       Levoquin  2. AMOUNT: \"How much was swallowed?\" (Err on the side of recording the maximal amount that is missing)       1 tablet  3. ONSET: " "\"When was it probably swallowed?\" (Minutes or hours ago)       now  4. SYMPTOMS: \"Do you have any symptoms?\" If so, ask: \"What are they?\" (e.g., abdominal pain, vomiting, weakness)       No symptoms  5. SUICIDAL: \"Did you take this to hurt or kill yourself?\"      n/a  6. PREGNANCY: \"Is there any chance you are pregnant?\" \"When was your last menstrual period?\"      n/a    Protocols used: POISONING-ADULT-    Susan Myles RN, BSN  [facility]    "

## 2017-08-06 ENCOUNTER — NURSE TRIAGE (OUTPATIENT)
Dept: NURSING | Facility: CLINIC | Age: 52
End: 2017-08-06

## 2017-08-06 ENCOUNTER — OFFICE VISIT (OUTPATIENT)
Dept: URGENT CARE | Facility: URGENT CARE | Age: 52
End: 2017-08-06
Payer: COMMERCIAL

## 2017-08-06 VITALS
HEART RATE: 73 BPM | SYSTOLIC BLOOD PRESSURE: 119 MMHG | TEMPERATURE: 97.8 F | WEIGHT: 245.13 LBS | DIASTOLIC BLOOD PRESSURE: 79 MMHG | OXYGEN SATURATION: 98 % | BODY MASS INDEX: 32.34 KG/M2

## 2017-08-06 DIAGNOSIS — Z48.02 ENCOUNTER FOR REMOVAL OF SUTURES: Primary | ICD-10-CM

## 2017-08-06 PROCEDURE — 99213 OFFICE O/P EST LOW 20 MIN: CPT | Performed by: NURSE PRACTITIONER

## 2017-08-06 RX ORDER — METRONIDAZOLE 500 MG/1
TABLET ORAL
Refills: 0 | COMMUNITY
Start: 2017-08-03 | End: 2017-08-23

## 2017-08-06 RX ORDER — PHENAZOPYRIDINE HYDROCHLORIDE 200 MG/1
TABLET, FILM COATED ORAL
Refills: 0 | COMMUNITY
Start: 2017-07-31 | End: 2017-08-06

## 2017-08-06 RX ORDER — OXYCODONE HYDROCHLORIDE 5 MG/1
TABLET ORAL
Refills: 0 | COMMUNITY
Start: 2017-08-03 | End: 2017-09-06

## 2017-08-06 RX ORDER — LEVOFLOXACIN 500 MG/1
TABLET, FILM COATED ORAL
Refills: 0 | COMMUNITY
Start: 2017-08-01 | End: 2017-08-23

## 2017-08-06 NOTE — NURSING NOTE
"Chief Complaint   Patient presents with     Suture Removal     left pinky finger, placed here on 7/28/17 by SURY Gutierrez       Initial /79 (BP Location: Left arm, Patient Position: Chair, Cuff Size: Adult Regular)  Pulse 73  Temp 97.8  F (36.6  C) (Oral)  Wt 245 lb 2 oz (111.2 kg)  SpO2 98%  BMI 32.34 kg/m2 Estimated body mass index is 32.34 kg/(m^2) as calculated from the following:    Height as of 10/22/15: 6' 1\" (1.854 m).    Weight as of this encounter: 245 lb 2 oz (111.2 kg).  Medication Reconciliation: complete   Ana Lilia Prater MA    "

## 2017-08-06 NOTE — PROGRESS NOTES
SUBJECTIVE:                                                    Bijan Livingston is a 51 year old male who presents to clinic today for the following health issues:      Left suture removal      Duration: Had for 9 days    Description (location/character/radiation): left pinky finger    Intensity:  mild    Accompanying signs and symptoms: suture removal    History (similar episodes/previous evaluation): None    Precipitating or alleviating factors: None    Therapies tried and outcome: None     Bijan presents to the clinic today for  removal of sutures.  The patient has had the sutures in place for 9 days.    There has been no history of infection or drainage.    Review Of Systems  Skin: positive for sutures  Cardiovascular: negative, chest pain and exertional chest pain or pressure  Neurologic: negative and numbness or tingling of hands    Physical exam  Skin  5 sutures are seen located on the left little finger.  The wound is healing well with no signs of infection.  Heart & CV:  RRR no murmur.  Intact distal pulses, good cap refill.  LUNGS:  CTA B/L, no wheezing or crackles.      Tetanus status is up to date.    Assessment    ICD-10-CM    1. Encounter for removal of sutures Z48.02          PLan    All sutures were easily removed today.  Routine wound care discussed.  The patient will follow up as needed.

## 2017-08-06 NOTE — PATIENT INSTRUCTIONS
"  Suture or Staple Removal  You were seen today for a suture or staple removal. Your wound is healing as expected. The wound has healed well enough that the sutures or staples can be removed. The wound will continue to heal for the next few months.  At this time there is no sign of infection.   Home care    If you have pain, take pain medicine as advised by your healthcare provider.     Keep your wound clean and protected by covering it with a bandage for the next week or so.     Wash your hands with soap and warm water before and after caring for your wound. This helps prevent infection.    Clean the wound gently with soap and warm water daily or as directed by your child s health care provider. Do not use iodine, alcohol, or other cleansers on the wound.  Gently pat it dry. Put on a new bandage, if needed. Do not reuse bandages.    If the area gets wet, gently pat it dry with a clean cloth. Replace the wet bandage with a dry one.    Check the wound daily for signs of infection. (These are listed under \"When to seek medical advice\" below.)    You may shower and bathe as usual. Swimming is now permitted.  Follow-up care  Follow up with your healthcare provider as advised.  When to seek medical advice   Call your healthcare provider if any of the following occur:    Wound reopens or bleeds    Signs of an infection, such as:    Increasing redness or swelling around the wound    Increased warmth from the wound    Worsening pain    Red streaking lines away from the wound    Fluid draining from the wound    Fever of 100.4 F (38 C) or higher, or as directed by your child's healthcare provider  Date Last Reviewed: 9/27/2015 2000-2017 [employer]. [address] [place] [address], [place] [address]. All rights reserved. This information is not intended as a substitute for professional medical care. Always follow your healthcare professional's instructions.        "

## 2017-08-06 NOTE — MR AVS SNAPSHOT
"              After Visit Summary   8/6/2017    Bijan Livingston    MRN: [id number]           Patient Information     Date Of Birth          1965        Visit Information        Provider Department      8/6/2017 11:55 AM Yadira Hollins NP [facility]        Care Instructions      Suture or Staple Removal  You were seen today for a suture or staple removal. Your wound is healing as expected. The wound has healed well enough that the sutures or staples can be removed. The wound will continue to heal for the next few months.  At this time there is no sign of infection.   Home care    If you have pain, take pain medicine as advised by your healthcare provider.     Keep your wound clean and protected by covering it with a bandage for the next week or so.     Wash your hands with soap and warm water before and after caring for your wound. This helps prevent infection.    Clean the wound gently with soap and warm water daily or as directed by your child s health care provider. Do not use iodine, alcohol, or other cleansers on the wound.  Gently pat it dry. Put on a new bandage, if needed. Do not reuse bandages.    If the area gets wet, gently pat it dry with a clean cloth. Replace the wet bandage with a dry one.    Check the wound daily for signs of infection. (These are listed under \"When to seek medical advice\" below.)    You may shower and bathe as usual. Swimming is now permitted.  Follow-up care  Follow up with your healthcare provider as advised.  When to seek medical advice   Call your healthcare provider if any of the following occur:    Wound reopens or bleeds    Signs of an infection, such as:    Increasing redness or swelling around the wound    Increased warmth from the wound    Worsening pain    Red streaking lines away from the wound    Fluid draining from the wound    Fever of 100.4 F (38 C) or higher, or as directed by your child's healthcare provider  Date Last Reviewed: 9/27/2015    " "3490-6475 [employer]. [address] [place] [address], [place] [address]. All rights reserved. This information is not intended as a substitute for professional medical care. Always follow your healthcare professional's instructions.                Follow-ups after your visit        Who to contact     If you have questions or need follow up information about today's clinic visit or your schedule please contact [facility] SHARRI MONTERO directly at [phone].  Normal or non-critical lab and imaging results will be communicated to you by [facility] [employer], letter or phone within 4 business days after the clinic has received the results. If you do not hear from us within 7 days, please contact the clinic through [employer] or phone. If you have a critical or abnormal lab result, we will notify you by phone as soon as possible.  Submit refill requests through [employer] or call your pharmacy and they will forward the refill request to us. Please allow 3 business days for your refill to be completed.          Additional Information About Your Visit        [facility] [employer] Information     [employer] lets you send messages to your doctor, view your test results, renew your prescriptions, schedule appointments and more. To sign up, go to [email or web address] . Click on \"Log in\" on the left side of the screen, which will take you to the Welcome page. Then click on \"Sign up Now\" on the right side of the page.     You will be asked to enter the access code listed below, as well as some personal information. Please follow the directions to create your username and password.     Your access code is: [email or web address]  Expires: 2017 10:19 PM     Your access code will  in 90 days. If you need help or a new code, please call your [facility] clinic or [phone].        Care EveryWhere ID     This is your Care EveryWhere ID. This could be used by other organizations to access your [facility] medical records  [id number]        Your Vitals " Were     Pulse Temperature Pulse Oximetry BMI (Body Mass Index)          73 97.8  F (36.6  C) (Oral) 98% 32.34 kg/m2         Blood Pressure from Last 3 Encounters:   08/06/17 119/79   07/28/17 133/78   07/23/17 138/70    Weight from Last 3 Encounters:   08/06/17 245 lb 2 oz (111.2 kg)   07/28/17 247 lb (112 kg)   06/13/17 250 lb (113.4 kg)              Today, you had the following     No orders found for display       Primary Care Provider Office Phone # Fax #    [facility] [phone]       [address]  [facility] [address]        Equal Access to Services     EVE KEMP : Anne Tong, watracey pollack, qasabina kaalmada graciela, valencia carlton. So [facility] [phone].    ATENCIÓN: Si habla español, tiene a diaz disposición servicios gratuitos de asistencia lingüística. Llame al [phone].    We comply with applicable federal civil rights laws and [place] laws. We do not discriminate on the basis of race, color, national origin, age, disability sex, sexual orientation or gender identity.            Thank you!     Thank you for choosing [facility]  for your care. Our goal is always to provide you with excellent care. Hearing back from our patients is one way we can continue to improve our services. Please take a few minutes to complete the written survey that you may receive in the mail after your visit with us. Thank you!             Your Updated Medication List - Protect others around you: Learn how to safely use, store and throw away your medicines at www.disposemymeds.org.          This list is accurate as of: 8/6/17 12:18 PM.  Always use your most recent med list.                   Brand Name Dispense Instructions for use Diagnosis    DILANTIN 100 MG CR capsule   Generic drug:  phenytoin      5 caps every am        doxycycline Monohydrate 100 MG Caps           levofloxacin 500 MG tablet    LEVAQUIN          metroNIDAZOLE 500  MG tablet    FLAGYL          oxyCODONE 5 MG IR tablet    ROXICODONE          traZODone 50 MG tablet    DESYREL

## 2017-08-07 ENCOUNTER — TRANSFERRED RECORDS (OUTPATIENT)
Dept: HEALTH INFORMATION MANAGEMENT | Facility: CLINIC | Age: 52
End: 2017-08-07

## 2017-08-07 NOTE — TELEPHONE ENCOUNTER
Additional Information    Caller has medication question only, adult not sick, and triager answers question    Protocols used: MEDICATION QUESTION CALL-ADULT-AH  Wanted to know if he could take Tylenol or Ibuprofen with the Oxycodone, and also wants to know if he should be retested for STDs.  He will call clinic in am 8-7-17.  Shaila Shaffer RN  [facility]

## 2017-08-10 ENCOUNTER — TRANSFERRED RECORDS (OUTPATIENT)
Dept: HEALTH INFORMATION MANAGEMENT | Facility: CLINIC | Age: 52
End: 2017-08-10

## 2017-08-17 ENCOUNTER — PRE VISIT (OUTPATIENT)
Dept: UROLOGY | Facility: CLINIC | Age: 52
End: 2017-08-17

## 2017-08-17 NOTE — TELEPHONE ENCOUNTER
PREVISIT INFORMATION                                                    Bijan Livingston scheduled for future visit at [facility] specialty clinics.    Patient is scheduled to see Kenneth  on 08/23  Reason for visit: penile pain   Referring provider: Hilario Walsh PA-C  Has patient seen previous specialist?   Medical Records:  Left message for pt to return call to clinic     REVIEW                                                      New patient packet mailed to patient: Yes  Medication reconciliation complete: No      PLAN/FOLLOW-UP NEEDED                                                      Patient Reminders Given:    Informed patient to bring an updated list of allergies, medications, pharmacy details and insurance information. Directed patient to come to the 2nd floor, check-in #4 for their appointment. Informed patient to call back if appointment needs to be cancelled or rescheduled at [phone].    Reminded patient to bring any outside records regarding this appointment or have them faxed to clinic at [phone].    Reminded patient to complete and bring in urology questionnaire. Also for patient to please come with a full bladder and to ask , if early to get staff member for sample.

## 2017-08-23 ENCOUNTER — TRANSFERRED RECORDS (OUTPATIENT)
Dept: HEALTH INFORMATION MANAGEMENT | Facility: CLINIC | Age: 52
End: 2017-08-23

## 2017-08-23 ENCOUNTER — OFFICE VISIT (OUTPATIENT)
Dept: UROLOGY | Facility: CLINIC | Age: 52
End: 2017-08-23
Payer: COMMERCIAL

## 2017-08-23 VITALS
DIASTOLIC BLOOD PRESSURE: 79 MMHG | BODY MASS INDEX: 32.4 KG/M2 | SYSTOLIC BLOOD PRESSURE: 114 MMHG | WEIGHT: 245.6 LBS | HEART RATE: 90 BPM

## 2017-08-23 DIAGNOSIS — N50.819 EPIDIDYMAL PAIN: ICD-10-CM

## 2017-08-23 DIAGNOSIS — N41.1 CHRONIC PROSTATITIS: ICD-10-CM

## 2017-08-23 DIAGNOSIS — Z12.5 SCREENING FOR PROSTATE CANCER: ICD-10-CM

## 2017-08-23 DIAGNOSIS — N48.89 PENILE PAIN: Primary | ICD-10-CM

## 2017-08-23 LAB
ALBUMIN UR-MCNC: NEGATIVE MG/DL
APPEARANCE UR: CLEAR
BILIRUB UR QL STRIP: NEGATIVE
COLOR UR AUTO: NORMAL
GLUCOSE UR STRIP-MCNC: NEGATIVE MG/DL
HGB UR QL STRIP: NEGATIVE
KETONES UR STRIP-MCNC: NEGATIVE MG/DL
LEUKOCYTE ESTERASE UR QL STRIP: NEGATIVE
NITRATE UR QL: NEGATIVE
PH UR STRIP: 7 PH (ref 5–7)
PSA SERPL-ACNC: 0.53 UG/L (ref 0–4)
RBC #/AREA URNS AUTO: NORMAL /HPF
SOURCE: NORMAL
SP GR UR STRIP: 1.01 (ref 1–1.03)
UROBILINOGEN UR STRIP-MCNC: NORMAL MG/DL (ref 0–2)
WBC #/AREA URNS AUTO: NORMAL /HPF

## 2017-08-23 PROCEDURE — 87109 MYCOPLASMA: CPT | Mod: 59 | Performed by: UROLOGY

## 2017-08-23 PROCEDURE — G0103 PSA SCREENING: HCPCS | Performed by: UROLOGY

## 2017-08-23 PROCEDURE — 86696 HERPES SIMPLEX TYPE 2 TEST: CPT | Performed by: UROLOGY

## 2017-08-23 PROCEDURE — 86695 HERPES SIMPLEX TYPE 1 TEST: CPT | Performed by: UROLOGY

## 2017-08-23 PROCEDURE — 87086 URINE CULTURE/COLONY COUNT: CPT | Performed by: UROLOGY

## 2017-08-23 PROCEDURE — 87109 MYCOPLASMA: CPT | Performed by: UROLOGY

## 2017-08-23 PROCEDURE — 84403 ASSAY OF TOTAL TESTOSTERONE: CPT | Performed by: UROLOGY

## 2017-08-23 PROCEDURE — 36415 COLL VENOUS BLD VENIPUNCTURE: CPT | Performed by: UROLOGY

## 2017-08-23 PROCEDURE — 81001 URINALYSIS AUTO W/SCOPE: CPT | Performed by: UROLOGY

## 2017-08-23 PROCEDURE — 99204 OFFICE O/P NEW MOD 45 MIN: CPT | Performed by: UROLOGY

## 2017-08-23 RX ORDER — CIPROFLOXACIN 500 MG/1
500 TABLET, FILM COATED ORAL 2 TIMES DAILY
Qty: 60 TABLET | Refills: 1 | Status: SHIPPED | OUTPATIENT
Start: 2017-08-23 | End: 2017-09-06

## 2017-08-23 ASSESSMENT — PAIN SCALES - GENERAL: PAINLEVEL: MILD PAIN (3)

## 2017-08-23 NOTE — PROGRESS NOTES
I am seeing Bijan Livingston in consultation from Dr. Neves for evaluation of scrotal and penile pain.    HPI:  Bijan Livingston is a 51 year old male with 2 years of right testis pain.  Was dx'ed with epididymitis, treated with antibiotics, several courses.    Pain comes and goes.  Worse with more activity/ work.  Left started being bothersome about 1 year ago.  He feels both testes are larger than 3-4 hrs ago.  This testis pain prevents him from working long hours.  Left side comes and goes.  Right testis more bothersome.    Scrotal ultrasound 6/29/17  Right 5 x 3.1 x 3.5 cm  Left 4.1 x 2.6 x 3.3 cm    A right epididymal head cyst was noted, 13mm.  Left epididymis enlarged and heterogenous with normal blood flow.    For the testis pain did 28d course of doxycycline.  Also tried metronidazole and Levaquin course.    Noting some penile pain also, shortly after Jan 2017 when he slept with his ex wife.  She may have herpes.  He's not seen blisters.  Worst around June and July.  Didn't start right after intercourse with his ex, was a few months after.    He's not sure if he's had HSV serology testing.  STD testing was negative.  He primarily has stinging/ burning pain in the penis.    UA was crystal clear 6/16/17    He had a vasectomy 7 years ago.  He was asked to do a second post-vasectomy semen analysis.    He started gabapentin about a week ago.    Rest, cooler temperatures make the testis pain better.  With less activity and cooler temps pain in scrotum is dramatically better, almost gone.    Lower Urinary Tract Symptoms, at present:  Does drink a lot of water daily.    Frequency: q2 hrs    Urgency: not really.  Urge makes penis hurt more.    Nocturia 2-5 times/night.    Weakness of stream: pretty good.    Intermittency: no    Straining: maybe just to start a little    Emptying: yes feeling empty, but can double void sometimes.    Hematuria: none, no dysuria.  Sometimes voiding helps the pain a little.  Urgency  makes the penis hurt worse.       REVIEW OF SYSTEMS:  General: losing some weight, about 25-28 lbs.  Not sure why.  Skin: feeling hot over body, also some itching and scratching feeling over body over last 1 year or so.  Eyes: negative  Ears/Nose/Throat: negative  Respiratory: negative  Cardiovascular: negative  Gastrointestinal: negative  Genitourinary: see HPI  Musculoskeletal: negative  Neurologic: negative  Psychiatric: negative  Hematologic/Lymphatic/Immunologic: negative  Endocrine: negative    PAST MEDICAL HX:  Past Medical History:   Diagnosis Date     Adjustment disorder with anxious mood 5/22/2015     Epilepsy (H) 5/22/2015     Hyperlipidemia LDL goal < 130 2/2/2015     Obesity 2/2/2015     Seizures (H)     Last one was mid-1980's     Sleep disorder 5/22/2015     Snoring 5/22/2015       PAST SURG HX:  Past Surgical History:   Procedure Laterality Date     LAPAROSCOPIC APPENDECTOMY  8/29/2011    Procedure:LAPAROSCOPIC APPENDECTOMY; Laparoscopic Appendectomy; Surgeon:ALLYN MCCORMICK; Location:[facility]     LOBECTOMY LUNG      Right, upper 1/3        FAMILY HX:  No family history on file.    SOCIAL HX:  Social History   Substance Use Topics     Smoking status: Former Smoker     Packs/day: 0.00     Quit date: 5/29/2010     Smokeless tobacco: Never Used     Alcohol use Yes      Comment: 1-2/month       MEDICATIONS:  Current Outpatient Prescriptions   Medication Sig     ACETAMINOPHEN PO      IBUPROFEN PO      [employer] FIBER GUMMIES PO      GLUCOSAMINE SULFATE PO Take 1,000 mg by mouth daily     GABAPENTIN PO      DiphenhydrAMINE HCl (BENADRYL PO)      MELATONIN PO      oxyCODONE (ROXICODONE) 5 MG IR tablet      DILANTIN 100 MG OR CAPS 5 caps every am     No current facility-administered medications for this visit.        ALLERGIES:  Review of patient's allergies indicates no known allergies.      GENERAL PHYSICAL EXAM:     /79 (BP Location: Left arm, Patient Position: Chair, Cuff Size: Adult Large)  Pulse 90  Wt  111.4 kg (245 lb 9.6 oz)  BMI 32.4 kg/m2   Constitutional: No acute distress. Well nourished.   PSYCH: normal mood and affect.  NEURO: normal gait, no focal deficits.   EYES: anicteric, EOMI, PERR.  ENT: neck supple, no lymphadenopathy, mucosae moist, no thrush.  CARDIOPULMONARY: breathing non-labored, pulse regular rate/rhythm, no peripheral edema.  GI: Abdomen soft, non-tender, no organomegaly.  MUSCULOSKELETAL: normal limb proportions, no muscle wasting, no contractures.  SKIN: Normal virilized hair distribution, no lesions, warts or rashes over genitalia, abdomen extremities or face.  HEME/LYMPH: no ecchymosis, no lymphadenopathy in groin or neck, no lymphedema.     EXAM:  Phallus  circumcised, meatus adequate, possible ventral plaques palpated that are tender to palpation along the ventral cavernosal cylinders.  Less tender dorsally.    Left testis descended , size is normal  , consistency is normal . No intra-testicular masses.  Testis itself is nontender.   Right testis descended , size is normal  , consistency is normal . No intra-testicular masses.   Right testis itself is nontender.  Epididymes present, both tender to palpation, left>R, both slightly indurated consistent with post-vasectomy.   Right epididymal head cyst noted that was a mild-moderately tender to palpation.  Left cord: Vas present.   Right cord: Vas present.    Prostate exam: estimate 35g, anodular, but prostate is tender to palpation.  Palpation of prostate does kind of reproduce the penile pain as well.    Imaging/labs:  Lab Results   Component Value Date    CR 0.81 05/22/2015    CR 0.80 08/29/2011     Lab Results   Component Value Date    PSA 0.42 05/22/2015 8/7/17  IMPRESSION:  1.  No acute intra-abdominal abnormality identified.  2.  The penis is not well evaluated on CT examination. MRI with and without contrast would be a more helpful examination.  3.  No lymphadenopathy or intra-abdominal mass identified.  4.  Status post  appendectomy.  5.  Left renal inferior pole mild dense lesion may represent a complex cyst or proteinaceous cyst. A renal Ultrasound can be done for further evaluation.    ASSESSMENT:     Bilateral epididymis pain R>L - consistent with post-vasectomy pain syndrome.    Left epididymal head cyst    Prostatitis/ penile pain.  Possible Peyronie's disease, hard to say as too tender to examine well on exam.    Post-vasectomy pain syndrome bilaterally.  Less likely bacterial epididymitis.        PLAN:    nsaids for possible Peyronie's disease.    DEVENDRA    PSA    UA and culture.  Also ureaplasma, mycoplasma.    Herpes serology    Discussed acupuncture    MRI low back - he's had back pain and also advised for MRI.    Cipro x 30d.    Hot bath 30 min nightly.    Chronic pain in epididymis. Options I discussed with him today were:  1.  Observation  2. Conservative management with sitz bath, nsaids, repeat trial abx.  3. Referral to pain specialist such as Dr. Sandra Somers From [facility] for regional anesthesia or nerve block procedures.  4. Epididymectomy ( estimate 90% improvement, 60% pain cure)  5. If he responds completely to a cord block with 1% lidocaine: microscopic denervation of the spermatic cord ( estimate 100% chance pain improvement, 80+% chance pain cure in my personal experience).  6. Orchiectomy, estimate 95% pain cure, but could have phantom pain and hypogonadism.    Copied cc to Consulting provider     Thank-you for the kind consultation.  Obed Cooper MD     Urological Surgeon

## 2017-08-23 NOTE — LETTER
8/23/2017       RE: Bijan Livingston  [address] [facility] [address]     Dear Colleague,    Thank you for referring your patient, Bijan Livingston, to the [facility]. Please see a copy of my visit note below.    I am seeing Bijan Livingston in consultation from Dr. Neves for evaluation of scrotal and penile pain.    HPI:  Bijan Livingston is a 51 year old male with 2 years of right testis pain.  Was dx'ed with epididymitis, treated with antibiotics, several courses.    Pain comes and goes.  Worse with more activity/ work.  Left started being bothersome about 1 year ago.  He feels both testes are larger than 3-4 hrs ago.  This testis pain prevents him from working long hours.  Left side comes and goes.  Right testis more bothersome.    Scrotal ultrasound 6/29/17  Right 5 x 3.1 x 3.5 cm  Left 4.1 x 2.6 x 3.3 cm    A right epididymal head cyst was noted, 13mm.  Left epididymis enlarged and heterogenous with normal blood flow.    For the testis pain did 28d course of doxycycline.  Also tried metronidazole and Levaquin course.    Noting some penile pain also, shortly after Jan 2017 when he slept with his ex wife.  She may have herpes.  He's not seen blisters.  Worst around June and July.  Didn't start right after intercourse with his ex, was a few months after.    He's not sure if he's had HSV serology testing.  STD testing was negative.  He primarily has stinging/ burning pain in the penis.    UA was crystal clear 6/16/17    He had a vasectomy 7 years ago.  He was asked to do a second post-vasectomy semen analysis.    He started gabapentin about a week ago.    Rest, cooler temperatures make the testis pain better.  With less activity and cooler temps pain in scrotum is dramatically better, almost gone.    Lower Urinary Tract Symptoms, at present:  Does drink a lot of water daily.    Frequency: q2 hrs    Urgency: not really.  Urge makes penis hurt  more.    Nocturia 2-5 times/night.    Weakness of stream: pretty good.    Intermittency: no    Straining: maybe just to start a little    Emptying: yes feeling empty, but can double void sometimes.    Hematuria: none, no dysuria.  Sometimes voiding helps the pain a little.  Urgency makes the penis hurt worse.       REVIEW OF SYSTEMS:  General: losing some weight, about 25-28 lbs.  Not sure why.  Skin: feeling hot over body, also some itching and scratching feeling over body over last 1 year or so.  Eyes: negative  Ears/Nose/Throat: negative  Respiratory: negative  Cardiovascular: negative  Gastrointestinal: negative  Genitourinary: see HPI  Musculoskeletal: negative  Neurologic: negative  Psychiatric: negative  Hematologic/Lymphatic/Immunologic: negative  Endocrine: negative    PAST MEDICAL HX:  Past Medical History:   Diagnosis Date     Adjustment disorder with anxious mood 5/22/2015     Epilepsy (H) 5/22/2015     Hyperlipidemia LDL goal < 130 2/2/2015     Obesity 2/2/2015     Seizures (H)     Last one was mid-1980's     Sleep disorder 5/22/2015     Snoring 5/22/2015       PAST SURG HX:  Past Surgical History:   Procedure Laterality Date     LAPAROSCOPIC APPENDECTOMY  8/29/2011    Procedure:LAPAROSCOPIC APPENDECTOMY; Laparoscopic Appendectomy; Surgeon:ALLYN MCCORMICK; Location:[facility]     LOBECTOMY LUNG      Right, upper 1/3        FAMILY HX:  No family history on file.    SOCIAL HX:  Social History   Substance Use Topics     Smoking status: Former Smoker     Packs/day: 0.00     Quit date: 5/29/2010     Smokeless tobacco: Never Used     Alcohol use Yes      Comment: 1-2/month       MEDICATIONS:  Current Outpatient Prescriptions   Medication Sig     ACETAMINOPHEN PO      IBUPROFEN PO      [employer] FIBER GUMMIES PO      GLUCOSAMINE SULFATE PO Take 1,000 mg by mouth daily     GABAPENTIN PO      DiphenhydrAMINE HCl (BENADRYL PO)      MELATONIN PO      oxyCODONE (ROXICODONE) 5 MG IR tablet      DILANTIN 100 MG OR CAPS 5 caps  every am     No current facility-administered medications for this visit.        ALLERGIES:  Review of patient's allergies indicates no known allergies.      GENERAL PHYSICAL EXAM:     /79 (BP Location: Left arm, Patient Position: Chair, Cuff Size: Adult Large)  Pulse 90  Wt 111.4 kg (245 lb 9.6 oz)  BMI 32.4 kg/m2   Constitutional: No acute distress. Well nourished.   PSYCH: normal mood and affect.  NEURO: normal gait, no focal deficits.   EYES: anicteric, EOMI, PERR.  ENT: neck supple, no lymphadenopathy, mucosae moist, no thrush.  CARDIOPULMONARY: breathing non-labored, pulse regular rate/rhythm, no peripheral edema.  GI: Abdomen soft, non-tender, no organomegaly.  MUSCULOSKELETAL: normal limb proportions, no muscle wasting, no contractures.  SKIN: Normal virilized hair distribution, no lesions, warts or rashes over genitalia, abdomen extremities or face.  HEME/LYMPH: no ecchymosis, no lymphadenopathy in groin or neck, no lymphedema.     EXAM:  Phallus  circumcised, meatus adequate, possible ventral plaques palpated that are tender to palpation along the ventral cavernosal cylinders.  Less tender dorsally.    Left testis descended , size is normal  , consistency is normal . No intra-testicular masses.  Testis itself is nontender.   Right testis descended , size is normal  , consistency is normal . No intra-testicular masses.   Right testis itself is nontender.  Epididymes present, both tender to palpation, left>R, both slightly indurated consistent with post-vasectomy.   Right epididymal head cyst noted that was a mild-moderately tender to palpation.  Left cord: Vas present.   Right cord: Vas present.    Prostate exam: estimate 35g, anodular, but prostate is tender to palpation.  Palpation of prostate does kind of reproduce the penile pain as well.    Imaging/labs:  Lab Results   Component Value Date    CR 0.81 05/22/2015    CR 0.80 08/29/2011     Lab Results   Component Value Date    PSA 0.42  05/22/2015 8/7/17  IMPRESSION:  1.  No acute intra-abdominal abnormality identified.  2.  The penis is not well evaluated on CT examination. MRI with and without contrast would be a more helpful examination.  3.  No lymphadenopathy or intra-abdominal mass identified.  4.  Status post appendectomy.  5.  Left renal inferior pole mild dense lesion may represent a complex cyst or proteinaceous cyst. A renal Ultrasound can be done for further evaluation.    ASSESSMENT:     Bilateral epididymis pain R>L - consistent with post-vasectomy pain syndrome.    Left epididymal head cyst    Prostatitis/ penile pain.  Possible Peyronie's disease, hard to say as too tender to examine well on exam.    Post-vasectomy pain syndrome bilaterally.  Less likely bacterial epididymitis.        PLAN:    nsaids for possible Peyronie's disease.    DEVENDRA    PSA    UA and culture.  Also ureaplasma, mycoplasma.    Herpes serology    Discussed acupuncture    MRI low back - he's had back pain and also advised for MRI.    Cipro x 30d.    Hot bath 30 min nightly.    Chronic pain in epididymis. Options I discussed with him today were:  1.  Observation  2. Conservative management with sitz bath, nsaids, repeat trial abx.  3. Referral to pain specialist such as Dr. Sandra Somers From [facility] for regional anesthesia or nerve block procedures.  4. Epididymectomy ( estimate 90% improvement, 60% pain cure)  5. If he responds completely to a cord block with 1% lidocaine: microscopic denervation of the spermatic cord ( estimate 100% chance pain improvement, 80+% chance pain cure in my personal experience).  6. Orchiectomy, estimate 95% pain cure, but could have phantom pain and hypogonadism.    Copied cc to Consulting provider     Thank-you for the kind consultation.  Obed Cooper MD     Urological Surgeon    Again, thank you for allowing me to participate in the care of your patient.      Sincerely,    Obed Cooper MD

## 2017-08-23 NOTE — MR AVS SNAPSHOT
After Visit Summary   8/23/2017    Bijan Livingston    MRN: [id number]           Patient Information     Date Of Birth          1965        Visit Information        Provider Department      8/23/2017 11:00 AM Obed Cooper MD [facility]        Today's Diagnoses     Penile pain    -  1    Epididymal pain        Screening for prostate cancer        Chronic prostatitis           Follow-ups after your visit        Your next 10 appointments already scheduled     Aug 31, 2017  9:30 AM CDT   MR LUMBAR SPINE W CONTRAST with MGMR1   [facility] ([facility]    [address] [place] [address] [phone]           Take your medicines as usual, unless your doctor tells you not to. Bring a list of your current medicines to your exam (including vitamins, minerals and over-the-counter drugs).  You will be given intravenous contrast for this exam. To prepare:   The day before your exam, drink extra fluids at least six 8-ounce glasses (unless your doctor tells you to restrict your fluids).   Have a blood test (creatinine test) within 30 days of your exam. Go to your clinic or Diagnostic Imaging Department for this test.  The MRI machine uses a strong magnet. Please wear clothes without metal (snaps, zippers). A sweatsuit works well, or we may give you a hospital gown.  Please remove any body piercings and hair extensions before you arrive. You will also remove watches, jewelry, hairpins, wallets, dentures, partial dental plates and hearing aids. You may wear contact lenses, and you may be able to wear your rings. We have a safe place to keep your personal items, but it is safer to leave them at home.   **IMPORTANT** THE INSTRUCTIONS BELOW ARE ONLY FOR THOSE PATIENTS WHO HAVE BEEN TOLD THEY WILL RECEIVE SEDATION OR GENERAL ANESTHESIA DURING THEIR MRI PROCEDURE:  IF YOU WILL RECEIVE SEDATION (take medicine to help you relax during your  exam):   You must get the medicine from your doctor before you arrive. Bring the medicine to the exam. Do not take it at home.   Arrive one hour early. Bring someone who can take you home after the test. Your medicine will make you sleepy. After the exam, you may not drive, take a bus or take a taxi by yourself.   No eating 8 hours before your exam. You may have clear liquids up until 4 hours before your exam. (Clear liquids include water, clear tea, black coffee and fruit juice without pulp.)  IF YOU WILL RECEIVE ANESTHESIA (be asleep for your exam):   Arrive 1 1/2 hours early. Bring someone who can take you home after the test. You may not drive, take a bus or take a taxi by yourself.   No eating 8 hours before your exam. You may have clear liquids up until 4 hours before your exam. (Clear liquids include water, clear tea, black coffee and fruit juice without pulp.)  Please call the Imaging Department at your exam site with any questions.            Sep 25, 2017  9:00 AM CDT   Return Visit with Obed Cooper MD   [facility] ([facility])    [address] [place] [address] [phone]              Future tests that were ordered for you today     Open Future Orders        Priority Expected Expires Ordered    MR Lumbar Spine w Contrast Routine  8/23/2018 8/23/2017            Who to contact     If you have questions or need follow up information about today's clinic visit or your schedule please contact [facility] directly at [phone].  Normal or non-critical lab and imaging results will be communicated to you by MyChart, letter or phone within 4 business days after the clinic has received the results. If you do not hear from us within 7 days, please contact the clinic through MyChart or phone. If you have a critical or abnormal lab result, we will notify you by phone as soon as possible.  Submit refill requests through [employer] or call  your pharmacy and they will forward the refill request to us. Please allow 3 business days for your refill to be completed.          Additional Information About Your Visit        [employer] Information     [employer] is an electronic gateway that provides easy, online access to your medical records. With [employer]my[employer]ach, you can request a clinic appointment, read your test results, renew a prescription or communicate with your care team.     To sign up for [employer] visit the website at [email or web address]   You will be asked to enter the access code listed below, as well as some personal information. Please follow the directions to create your username and password.     Your access code is: [email or web address]  Expires: 2017 10:19 PM     Your access code will  in 90 days. If you need help or a new code, please contact your [facility] or call [phone] for assistance.        Care EveryWhere ID     This is your Care EveryWhere ID. This could be used by other organizations to access your [facility] medical records  [id number]        Your Vitals Were     Pulse BMI (Body Mass Index)                90 32.4 kg/m2           Blood Pressure from Last 3 Encounters:   17 114/79   17 119/79   17 133/78    Weight from Last 3 Encounters:   17 111.4 kg (245 lb 9.6 oz)   17 111.2 kg (245 lb 2 oz)   17 112 kg (247 lb)              We Performed the Following     Herpes Simplex Virus 1 and 2 IgG     Mycoplasma large colony culture     Prostate spec antigen screen     Testosterone total     UA with Microscopic     Ureaplasma culture     Urine Culture Aerobic Bacterial          Today's Medication Changes          These changes are accurate as of: 17 12:26 PM.  If you have any questions, ask your nurse or doctor.               Start taking these medicines.        Dose/Directions    ciprofloxacin 500 MG tablet   Commonly known as:  CIPRO   Used for:  Chronic  prostatitis   Started by:  Obed Cooper MD        Dose:  500 mg   Take 1 tablet (500 mg) by mouth 2 times daily   Quantity:  60 tablet   Refills:  1            Where to get your medicines      These medications were sent to [facility] [address] [place] [address] - [address]  [address], [facility] [address]     Phone:  [phone]     ciprofloxacin 500 MG tablet                Primary Care Provider Office Phone # Fax #    [facility] [phone]       [address] [place], [place] [address] [address]        Equal Access to Services     [facility]: Hadii aad ku hadasho Soomaali, waaxda luqadaha, qaybta kaalmada adeegyada, valencia vieyra . So [facility] [phone].    ATENCIÓN: Si habla español, tiene a diaz disposición servicios gratuitos de asistencia lingüística. [facility] [phone].    We comply with applicable federal civil rights laws and [place] laws. We do not discriminate on the basis of race, color, national origin, age, disability sex, sexual orientation or gender identity.            Thank you!     Thank you for choosing [facility]  for your care. Our goal is always to provide you with excellent care. Hearing back from our patients is one way we can continue to improve our services. Please take a few minutes to complete the written survey that you may receive in the mail after your visit with us. Thank you!             Your Updated Medication List - Protect others around you: Learn how to safely use, store and throw away your medicines at www.disposemymeds.org.          This list is accurate as of: 8/23/17 12:26 PM.  Always use your most recent med list.                   Brand Name Dispense Instructions for use Diagnosis    ACETAMINOPHEN PO       Penile pain, Epididymal pain       BENADRYL PO       Penile pain, Epididymal pain       ciprofloxacin 500 MG tablet    CIPRO    60 tablet    Take 1 tablet (500 mg) by mouth 2 times daily    Chronic  prostatitis       CVS FIBER GUMMIES PO       Penile pain, Epididymal pain       DILANTIN 100 MG CR capsule   Generic drug:  phenytoin      5 caps every am        GABAPENTIN PO       Penile pain, Epididymal pain       GLUCOSAMINE SULFATE PO      Take 1,000 mg by mouth daily    Penile pain, Epididymal pain       IBUPROFEN PO       Penile pain, Epididymal pain       MELATONIN PO       Penile pain, Epididymal pain       oxyCODONE 5 MG IR tablet    ROXICODONE

## 2017-08-23 NOTE — LETTER
Patient:  Bijan Livingston  :   1965  MRN:     [id number]        Mr.Mark FELICITY Livingston  [address] [facility] [address]        2017    Dear ,    We are writing to inform you of your recent test results.    Resulted Orders   Herpes Simplex Virus 1 and 2 IgG   Result Value Ref Range    Herpes Simplex Virus Type 1 IgG <0.2 0.0 - 0.8 AI      Comment:      No HSV-1 IgG antibodies detected.  Antibody index (AI) values reflect qualitative changes in antibody   concentration that cannot be directly associated with clinical condition or   disease state.      Herpes Simplex Virus Type 2 IgG <0.2 0.0 - 0.8 AI      Comment:      No HSV-2 IgG antibodies detected.  Antibody index (AI) values reflect qualitative changes in antibody   concentration that cannot be directly associated with clinical condition or   disease state.     Urine Culture Aerobic Bacterial   Result Value Ref Range    Specimen Description Midstream Urine     Culture Micro No growth    Prostate spec antigen screen   Result Value Ref Range    PSA 0.53 0 - 4 ug/L      Comment:      Assay Method:  Chemiluminescence using Siemens Vista analyzer   Ureaplasma culture   Result Value Ref Range    Specimen Description Unspecified Urine     Special Requests UTM     Culture Micro Culture negative monitoring continues    Mycoplasma large colony culture   Result Value Ref Range    Specimen Description Unspecified Urine     Special Requests UTM     Culture Micro Culture negative monitoring continues    Testosterone total   Result Value Ref Range    Testosterone Total 560 240 - 950 ng/dL      Comment:      This test was developed and its performance characteristics determined by the   [facility],  [facility]. It has   not been cleared or approved by the FDA. The laboratory is regulated under   CLIA as qualified to perform high-complexity testing. This test is used for   clinical purposes. It should  not be regarded as investigational or for   research.     UA with Microscopic ([place]; [facility])   Result Value Ref Range    Color Urine Light Yellow     Appearance Urine Clear     Glucose Urine Negative NEG^Negative mg/dL    Bilirubin Urine Negative NEG^Negative    Ketones Urine Negative NEG^Negative mg/dL    Specific Gravity Urine 1.006 1.003 - 1.035    Blood Urine Negative NEG^Negative    pH Urine 7.0 5.0 - 7.0 pH    Protein Albumin Urine Negative NEG^Negative mg/dL    Urobilinogen mg/dL Normal 0.0 - 2.0 mg/dL    Nitrite Urine Negative NEG^Negative    Leukocyte Esterase Urine Negative NEG^Negative    Source Midstream Urine     WBC Urine O - 2 OTO2^O - 2 /HPF    RBC Urine O - 2 OTO2^O - 2 /HPF       Please contact the [facility] at [phone] with any questions.      Sincerely,    Dr. Cooper's Office

## 2017-08-23 NOTE — NURSING NOTE
"Bijan Livingston's goals for this visit include:   Chief Complaint   Patient presents with     Consult     penile pain     He requests these members of his care team be copied on today's visit information: NONE    Initial /79 (BP Location: Left arm, Patient Position: Chair, Cuff Size: Adult Large)  Pulse 90  Wt 111.4 kg (245 lb 9.6 oz)  BMI 32.4 kg/m2 Estimated body mass index is 32.4 kg/(m^2) as calculated from the following:    Height as of 10/22/15: 1.854 m (6' 1\").    Weight as of this encounter: 111.4 kg (245 lb 9.6 oz).  BP completed using cuff size: large        "

## 2017-08-23 NOTE — LETTER
Patient:  Bijan Livingston  :   1965  MRN:     [id number]    Mr.Mark FELICITY Livingston  [address] [facility] [address]      2017    Dear ,    We are writing to inform you of your test results that are with in normal range and they indicate:   You have not had herpes virus exposure    Resulted Orders   Herpes Simplex Virus 1 and 2 IgG   Result Value Ref Range    Herpes Simplex Virus Type 1 IgG <0.2 0.0 - 0.8 AI      Comment:      No HSV-1 IgG antibodies detected.  Antibody index (AI) values reflect qualitative changes in antibody   concentration that cannot be directly associated with clinical condition or   disease state.      Herpes Simplex Virus Type 2 IgG <0.2 0.0 - 0.8 AI      Comment:      No HSV-2 IgG antibodies detected.  Antibody index (AI) values reflect qualitative changes in antibody   concentration that cannot be directly associated with clinical condition or   disease state.     Urine Culture Aerobic Bacterial   Result Value Ref Range    Specimen Description Midstream Urine     Culture Micro No growth    Prostate spec antigen screen   Result Value Ref Range    PSA 0.53 0 - 4 ug/L      Comment:      Assay Method:  Chemiluminescence using Siemens Vista analyzer   Ureaplasma culture   Result Value Ref Range    Specimen Description Unspecified Urine     Special Requests UTM     Culture Micro Culture negative monitoring continues    Mycoplasma large colony culture   Result Value Ref Range    Specimen Description Unspecified Urine     Special Requests UTM     Culture Micro Culture negative monitoring continues    UA with Microscopic ([place]; [facility])   Result Value Ref Range    Color Urine Light Yellow     Appearance Urine Clear     Glucose Urine Negative NEG^Negative mg/dL    Bilirubin Urine Negative NEG^Negative    Ketones Urine Negative NEG^Negative mg/dL    Specific Gravity Urine 1.006 1.003 - 1.035    Blood Urine Negative NEG^Negative    pH Urine 7.0 5.0 - 7.0  pH    Protein Albumin Urine Negative NEG^Negative mg/dL    Urobilinogen mg/dL Normal 0.0 - 2.0 mg/dL    Nitrite Urine Negative NEG^Negative    Leukocyte Esterase Urine Negative NEG^Negative    Source Midstream Urine     WBC Urine O - 2 OTO2^O - 2 /HPF    RBC Urine O - 2 OTO2^O - 2 /HPF     Please contact our office if you have any further questions.     Dr. Cooper's Office

## 2017-08-24 LAB
BACTERIA SPEC CULT: NO GROWTH
HSV1 IGG SERPL QL IA: <0.2 AI (ref 0–0.8)
HSV2 IGG SERPL QL IA: <0.2 AI (ref 0–0.8)
SPECIMEN SOURCE: NORMAL

## 2017-08-25 ENCOUNTER — TRANSFERRED RECORDS (OUTPATIENT)
Dept: HEALTH INFORMATION MANAGEMENT | Facility: CLINIC | Age: 52
End: 2017-08-25

## 2017-08-27 LAB — TESTOST SERPL-MCNC: 560 NG/DL (ref 240–950)

## 2017-08-30 ENCOUNTER — NURSE TRIAGE (OUTPATIENT)
Dept: NURSING | Facility: CLINIC | Age: 52
End: 2017-08-30

## 2017-08-30 LAB
BACTERIA SPEC CULT: NORMAL
BACTERIA SPEC CULT: NORMAL
Lab: NORMAL
Lab: NORMAL
SPECIMEN SOURCE: NORMAL
SPECIMEN SOURCE: NORMAL

## 2017-08-30 NOTE — TELEPHONE ENCOUNTER
Reason for Disposition    [1] DOUBLE DOSE (an extra dose or lesser amount) of prescription drug AND [2] any symptoms (e.g., dizziness, nausea, pain, sleepiness)    Additional Information    Negative: Severe difficulty breathing (e.g., struggling for each breath, speaks in single words)    Negative: Bluish lips, tongue, or face now    Negative: Seizure    Negative: Difficult to awaken or acting confused  (e.g., disoriented, slurred speech)    Negative: Shock suspected (e.g., cold/pale/clammy skin, too weak to stand, low BP, rapid pulse)    Negative: [1] Intentional overdose AND [2] suicidal thoughts or ideas    Negative: Suicide attempt, known or suspected    Negative: Sounds like a life-threatening emergency to the triager    Negative: Inhalation of smoke or fumes    Negative: Carbon monoxide exposure, known or suspected    Negative: Chemical in the eye    Negative: Chemical on the skin    Negative: Swallowed a (non-poisonous) foreign body    Negative: [1] HARMFUL SUBSTANCE or ACID or ALKALI ingestion (e.g., toilet , drain , lye, Clinitest tablets, ammonia, bleaches) AND [2] any symptoms (e.g., mouth pain, sore throat, breathing difficulty)    Negative: [1] PETROLEUM PRODUCT ingestion (e.g., kerosene, gasoline, benzene, furniture polish, lighter fluid) AND [2] any symptoms (e.g., breathing difficulty, coughing, vomiting)    Negative: [1] Poison Center advised caller to go to ED AND [2] caller seeking second opinion    Negative: Patient sounds very sick or weak to the triager    Negative: [1] HARMFUL SUBSTANCE or ACID or ALKALI ingestion (e.g., toilet , drain , lye, Clinitest tablets, ammonia, bleaches) AND [2] NO symptoms    Negative: [1] PETROLEUM PRODUCT ingestion (e.g., kerosene, gasoline, benzene, furniture polish, lighter fluid) AND [2] NO symptoms    Negative: [1] DOUBLE DOSE (an extra dose or lesser amount) of over-the-counter (OTC) drug AND [2] any symptoms (e.g., dizziness,  nausea, pain, sleepiness)    Protocols used: POISONING-ADULT-AH    Bijan calls and says that he took his Oxycodone at 10 PM and again at 11:30 PM, tonjuliane. Bijan says that he is only supposed to take this once, at HS prn. Bijan says that he made a mistake and accidentally took the medication, twice, tonight. [facility], for further assistance.

## 2017-08-31 ENCOUNTER — TELEPHONE (OUTPATIENT)
Dept: GASTROENTEROLOGY | Facility: CLINIC | Age: 52
End: 2017-08-31

## 2017-08-31 ENCOUNTER — RADIANT APPOINTMENT (OUTPATIENT)
Dept: MRI IMAGING | Facility: CLINIC | Age: 52
End: 2017-08-31
Attending: UROLOGY
Payer: COMMERCIAL

## 2017-08-31 DIAGNOSIS — N48.89 PENILE PAIN: ICD-10-CM

## 2017-08-31 DIAGNOSIS — N50.819 EPIDIDYMAL PAIN: ICD-10-CM

## 2017-08-31 PROCEDURE — 72148 MRI LUMBAR SPINE W/O DYE: CPT | Performed by: RADIOLOGY

## 2017-08-31 NOTE — TELEPHONE ENCOUNTER
Patient stopped into clinic today to talk to nurse about his medications. He reports that he stopped his Cipro because he developed pain in his arms and numbness. He is using Tylenol and Ibuprofen for epididymal pain. At this time he does not want to go on another antibiotic since he has been on antibiotics for a month. He is interested in doing what Dr. Cooper suggested in his last office visit; having antibiotics injected into his prostrate. Nurse scheduled patient for a follow up appointment with Dr. Cooper next week to discuss this option. Nurse advised patient to call clinic in the days prior to appointment with symptoms get worse or he develops a fever, and to go to the ER over the weekend. He verbalized understanding.  Nurse will update Urology nurse.    Jannie Valle RN, BSN  [facility]  GI/Gen Surg/Hepatology Care Coordinator

## 2017-09-06 ENCOUNTER — OFFICE VISIT (OUTPATIENT)
Dept: UROLOGY | Facility: CLINIC | Age: 52
End: 2017-09-06
Payer: COMMERCIAL

## 2017-09-06 VITALS
SYSTOLIC BLOOD PRESSURE: 123 MMHG | TEMPERATURE: 97.1 F | DIASTOLIC BLOOD PRESSURE: 77 MMHG | OXYGEN SATURATION: 97 % | HEART RATE: 77 BPM

## 2017-09-06 DIAGNOSIS — R10.2 PELVIC PAIN IN MALE: Primary | ICD-10-CM

## 2017-09-06 PROCEDURE — 99213 OFFICE O/P EST LOW 20 MIN: CPT | Performed by: UROLOGY

## 2017-09-06 ASSESSMENT — PAIN SCALES - GENERAL: PAINLEVEL: MILD PAIN (2)

## 2017-09-06 NOTE — NURSING NOTE
"Bijan Livingston's goals for this visit include:   Chief Complaint   Patient presents with     RECHECK     Penile pain        He requests these members of his care team be copied on today's visit information: pcp     Referring Provider:  No referring provider defined for this encounter.    Initial /77 (BP Location: Left arm, Patient Position: Chair, Cuff Size: Adult Large)  Pulse 77  Temp 97.1  F (36.2  C) (Oral)  SpO2 97% Estimated body mass index is 32.4 kg/(m^2) as calculated from the following:    Height as of 10/22/15: 1.854 m (6' 1\").    Weight as of 8/23/17: 111.4 kg (245 lb 9.6 oz).  BP completed using cuff size: large      "

## 2017-09-06 NOTE — PROGRESS NOTES
Pt here to follow-up groin pain.  Reviewed results of lumbar MRI.  This shows multi-level DJD.  It's not clear that there are any lesions causing testis or penile pain.   I placed a consult to orthopedics to evaluate the MRI and the patient.     We discussed that he does not appear to have an infection causing this. He has not responded to several different antibiotics, his urine is clear, his herpes serology was negative, Ureaplasma and mycoplasma are negative, urine culture is negative, urine bacteria PCR DNA screening is negative.     He is taking gabapentin, it's not clear that this is helping.    His PSA is normal 0.53 on August 23, 2017  Testosterone is normal 560, 8/23/17     He tried ciprofloxacin, this gave him tendinitis feeling and bilateral forearms. This has essentially resolved now after stopping the medication. He had the severe symptoms after just 3 or 4 doses.   Cipro was added to his allergy list.     He is still having bad genital pain, primarily in the penis and the right testicle. The left is not bothersome to him today.    Exam today:   Phallus  circumcised, meatus adequate, no obvious penileques palpated.  The ventral penis is very tender at the penoscrotal junction.  Penis not tender dorsally.     Left testis descended , size is normal, consistency is normal . No intra-testicular masses.  Testis itself is nontender.  left epididymis and cord structures essentially nontender today.    Right testis descended , size is normal  , consistency is normal . No intra-testicular masses.   Right testis and epididymis are tender. The right spermatic cord is not tender to palpation.  Epididymes present, right is sensitive to palpation, both slightly indurated consistent with post-vasectomy.   Right epididymal head cyst noted.    DEVENDRA deferred today     Assessment  Penile pain, right testicle pain. The etiology for this is uncertain. It has not responded to antibiotics to treat prostatitis. The pain to me  seems more neuropathic in origin. I discussed with him that surgery to denervate the right testicle would be the intervention I could offer. I would not recommend a orchiectomy or epididymectomy due to decreased success rates with the  Latter and over-invasiveness of the former.  I think it is prudent to explore nonsurgical options first.    Plan  #1 Ortho consult to review the MRI  #2 referral to Dr. Flores for urology pain evaluation.  #3 return to clinic for right cord block if he is interested in the denervation surgery, this would be a diagnostic procedure to estimate how he might respond to right cord denervation surgery.   #4 keep off Cipro  #5 declines further antibiotic trial.    Reji LUNA  15min visit, over 50% face to face in counseling/discussion of above issues.     Dictation software used.

## 2017-09-06 NOTE — MR AVS SNAPSHOT
After Visit Summary   9/6/2017    Bijan Livingston    MRN: [id number]           Patient Information     Date Of Birth          1965        Visit Information        Provider Department      9/6/2017 10:45 AM Obed Cooper MD [facility]        Today's Diagnoses     Pelvic pain in male    -  1       Follow-ups after your visit        Additional Services     ORTHO  REFERRAL       [facility] is referring you to the Orthopedic  Services at [facility].       The  Representative will assist you in the coordination of your Orthopedic and Musculoskeletal Care as prescribed by your physician.    The  Representative will call you within 1 business day to help schedule your appointment, or you may contact the  Representative at:    All areas ~ [phone]     Type of Referral : Non Surgical       Timeframe requested: Routine    Coverage of these services is subject to the terms and limitations of your health insurance plan.  Please call member services at your health plan with any benefit or coverage questions.      If X-rays, CT or MRI's have been performed, please contact the facility where they were done to arrange for , prior to your scheduled appointment.  Please bring this referral request to your appointment and present it to your specialist.                  Your next 10 appointments already scheduled     Sep 25, 2017  9:00 AM CDT   Return Visit with Obed Cooper MD   [facility]    [address] [place] [id number] [phone]              Who to contact     If you have questions or need follow up information about today's clinic visit or your schedule please contact [facility] directly at [phone].  Normal or non-critical lab and imaging results will be communicated to you by MyChart, letter or  phone within 4 business days after the clinic has received the results. If you do not hear from us within 7 days, please contact the clinic through Vidaao or phone. If you have a critical or abnormal lab result, we will notify you by phone as soon as possible.  Submit refill requests through Vidaao or call your pharmacy and they will forward the refill request to us. Please allow 3 business days for your refill to be completed.          Additional Information About Your Visit        Vidaao Information     Vidaao is an electronic gateway that provides easy, online access to your medical records. With Vidaao, you can request a clinic appointment, read your test results, renew a prescription or communicate with your care team.     To sign up for Vidaao visit the website at [email or web address]   You will be asked to enter the access code listed below, as well as some personal information. Please follow the directions to create your username and password.     Your access code is: [email or web address]  Expires: 2017 10:19 PM     Your access code will  in 90 days. If you need help or a new code, please contact your [facility] or call [phone] for assistance.        Care EveryWhere ID     This is your Care EveryWhere ID. This could be used by other organizations to access your [facility] medical records  [id number]        Your Vitals Were     Pulse Temperature Pulse Oximetry             77 97.1  F (36.2  C) (Oral) 97%          Blood Pressure from Last 3 Encounters:   17 123/77   17 114/79   17 119/79    Weight from Last 3 Encounters:   17 111.4 kg (245 lb 9.6 oz)   17 111.2 kg (245 lb 2 oz)   17 112 kg (247 lb)              We Performed the Following     [facility] REFERRAL        Primary Care Provider Office Phone # Fax #    [facility] [phone]       [address] [place] [address] [place] [address]        Equal Access to  Services     [facility]: Hadii aad ku hadbrittabhavin Monicaali, waaxda luqadaha, qaybta kaalmada graciela, valencia vieyra . So [facility] [phone].    ATENCIÓN: Si habla cristi, tiene a diaz disposición servicios gratuitos de asistencia lingüística. Llame al [phone].    We comply with applicable federal civil rights laws and [place] laws. We do not discriminate on the basis of race, color, national origin, age, disability sex, sexual orientation or gender identity.            Thank you!     Thank you for choosing [facility]  for your care. Our goal is always to provide you with excellent care. Hearing back from our patients is one way we can continue to improve our services. Please take a few minutes to complete the written survey that you may receive in the mail after your visit with us. Thank you!             Your Updated Medication List - Protect others around you: Learn how to safely use, store and throw away your medicines at www.disposemymeds.org.          This list is accurate as of: 9/6/17 11:39 AM.  Always use your most recent med list.                   Brand Name Dispense Instructions for use Diagnosis    ACETAMINOPHEN PO       Penile pain, Epididymal pain       BENADRYL PO       Penile pain, Epididymal pain       [employer] FIBER GUMMIES PO       Penile pain, Epididymal pain       DILANTIN 100 MG CR capsule   Generic drug:  phenytoin      5 caps every am        GABAPENTIN PO       Penile pain, Epididymal pain       GLUCOSAMINE SULFATE PO      Take 1,000 mg by mouth daily    Penile pain, Epididymal pain       IBUPROFEN PO       Penile pain, Epididymal pain       MELATONIN PO       Penile pain, Epididymal pain

## 2017-09-11 ENCOUNTER — OFFICE VISIT (OUTPATIENT)
Dept: ORTHOPEDICS | Facility: CLINIC | Age: 52
End: 2017-09-11
Payer: COMMERCIAL

## 2017-09-11 VITALS
OXYGEN SATURATION: 97 % | WEIGHT: 245 LBS | SYSTOLIC BLOOD PRESSURE: 112 MMHG | HEIGHT: 73 IN | HEART RATE: 94 BPM | BODY MASS INDEX: 32.47 KG/M2 | DIASTOLIC BLOOD PRESSURE: 76 MMHG

## 2017-09-11 DIAGNOSIS — N50.819 TESTICULAR PAIN: ICD-10-CM

## 2017-09-11 DIAGNOSIS — N48.89 PENILE PAIN: Primary | ICD-10-CM

## 2017-09-11 DIAGNOSIS — M54.16 LUMBAR RADICULOPATHY: ICD-10-CM

## 2017-09-11 PROCEDURE — 99214 OFFICE O/P EST MOD 30 MIN: CPT | Performed by: FAMILY MEDICINE

## 2017-09-11 ASSESSMENT — PAIN SCALES - GENERAL: PAINLEVEL: MILD PAIN (2)

## 2017-09-11 NOTE — NURSING NOTE
"Bijan Livingston's goals for this visit include: Evaluate and treat pelvic pain.   He requests these members of his care team be copied on today's visit information: yes    PCP: [facility] [place]    Referring Provider:  No referring provider defined for this encounter.    Chief Complaint   Patient presents with     Consult     Pelvic Pain     Pain x 2 years. No known injury.        Initial /76 (BP Location: Left arm, Patient Position: Chair, Cuff Size: Adult Regular)  Pulse 94  Ht 1.854 m (6' 1\")  Wt 111.1 kg (245 lb)  SpO2 97%  BMI 32.32 kg/m2 Estimated body mass index is 32.32 kg/(m^2) as calculated from the following:    Height as of this encounter: 1.854 m (6' 1\").    Weight as of this encounter: 111.1 kg (245 lb).  Medication Reconciliation: complete    "

## 2017-09-11 NOTE — PROGRESS NOTES
HISTORY OF PRESENT ILLNESS  Mr. Livingston is a pleasant 51 year old year old male who presents to clinic today with testicular pain. He is seen at the request of his urologist, Dr. Cooper.  Bijan is experiencing shooting pain in his penis and right testicle that started over 2 years ago with no clear incident.  He's had an extensive urology workup and has been treated for prostatitis and epididymitis. He is worsening despite treatment. He was specifically referred to rule out a potential radicular etiology to his pain. He did have a lumbar MRI. Bijan does note a history of left proximal lateral leg pain that radiates down to his knee at times. Worse with walking, worse with bending and moving. No right-sided radicular symptoms. Denies numbness, tingling, or weakness in either leg.  NSAIDS and gabapentin are helping somewhat.  Takes benadryl and melatonin.  Quality: shooting pain    Severity: moderate to severe  Timing: occurs intermittently  Associated signs & symptoms: none  Previous similar pain: no  Additional history: as documented    MEDICAL HISTORY  Patient Active Problem List   Diagnosis     Hyperlipidemia with target LDL less than 130     Obesity     Possible exposure to STD     Non compliance with medical treatment     Snoring     Sleep disorder     Epilepsy (H)     Adjustment disorder with anxious mood       Current Outpatient Prescriptions   Medication Sig Dispense Refill     ACETAMINOPHEN PO        IBUPROFEN PO        CVS FIBER GUMMIES PO        GLUCOSAMINE SULFATE PO Take 1,000 mg by mouth daily       GABAPENTIN PO        DiphenhydrAMINE HCl (BENADRYL PO)        MELATONIN PO        DILANTIN 100 MG OR CAPS 5 caps every am         Allergies   Allergen Reactions     Ciprofloxacin      Bilateral forearm tendonitis, severe.       No family history on file.    Additional medical/Social/Surgical histories reviewed in [facility] and updated as appropriate.     REVIEW OF SYSTEMS (9/11/2017)  10 point ROS of systems  "including Constitutional, Eyes, Respiratory, Cardiovascular, Gastroenterology, Genitourinary, Integumentary, Musculoskeletal, Psychiatric were all negative except for pertinent positives noted in my HPI.     PHYSICAL EXAM  Vitals:    09/11/17 0851   BP: 112/76   BP Location: Left arm   Patient Position: Chair   Cuff Size: Adult Regular   Pulse: 94   SpO2: 97%   Weight: 111.1 kg (245 lb)   Height: 1.854 m (6' 1\")     General  - normal appearance, in no obvious distress  CV  - normal peripheral perfusion  Pulm  - normal respiratory pattern, non-labored  Musculoskeletal - lumbar spine  - stance: normal gait without limp, no obvious leg length discrepancy, normal heel and toe walk  - inspection: normal bone and joint alignment, no obvious scoliosis  - palpation: no paravertebral or bony tenderness  - ROM: flexion exacerbates pain, normal extension, sidebending, rotation  - strength: lower extremities 5/5 in all planes  - special tests:  (+) straight leg raise  (+) slump test  Neuro  - patellar and Achilles DTRs 2+ bilaterally, lower extremity sensory deficit throughout L5 distribution, grossly normal coordination, normal muscle tone  Skin  - no ecchymosis, erythema, warmth, or induration, no obvious rash  Psych  - interactive, appropriate, normal mood and affect          ASSESSMENT & PLAN  Mr. Livingston is a 51 year old year old male who is in the office today with right testicular and penile pain.    I reviewed his lumbar MRI in the room with him which reads:  Multilevel degenerative disc disease with the most  significant findings noted at L3-L4 where there is small left  foraminal protrusion resulting in mild to moderate left foraminal  narrowing.    Bijan and I had a long discussion centering around his pain.  His pain pattern does suggest a neural etiology.  Given the direct location of his pain I do think that it may be arising from a sacral source, notably the S2 and S3 level.    I'm ordering an MRI of his sacrum " to investigate.  Depending upon the results I'm likely going to order a S2 or S3 right-sided nerve block, hopefully this can be diagnostic and therapeutic.    It was a pleasure taking care of Bijan.        Anant Pruitt DO, CAM

## 2017-09-11 NOTE — MR AVS SNAPSHOT
After Visit Summary   2017    Bijan Livingston    MRN: [id number]           Patient Information     Date Of Birth          1965        Visit Information        Provider Department      2017 8:40 AM Anant Pruitt, DO [facility]        Today's Diagnoses     Penile pain    -  1    Testicular pain        Lumbar radiculopathy          Care Instructions    Thanks for coming today.  Ortho/Sports Medicine Clinic  [address] [place] [address]    To schedule future appointments in Ortho Clinic, you may call [phone].    To schedule ordered imaging by your provider:   Call Central Imaging [phone]    To schedule an injection ordered by your provider:  Call Central Imaging Injection scheduling line: [phone]  ApiFixhart available online at:  [email or web address]    Please call if any further questions or concerns ([phone]).  Clinic hours 8 am to 5 pm.    Return to clinic (call) if symptoms worsen or fail to improve.            Follow-ups after your visit        Your next 10 appointments already scheduled     Sep 14, 2017  7:15 AM CDT   MR SACRUM AND COCCYX W/O CONTRAST with MGMR1   [facility]    [address] [place] [id number] [phone]           Take your medicines as usual, unless your doctor tells you not to. Bring a list of your current medicines to your exam (including vitamins, minerals and over-the-counter drugs). Also bring the results of similar scans you may have had.  Please remove any body piercings and hair extensions before you arrive.  Follow your doctor s orders. If you do not, we may have to postpone your exam.  You will not have contrast for this exam. You do not need to do anything special to prepare.  The MRI machine uses a strong magnet. Please wear clothes without metal (snaps, zippers). A sweatsuit works well, or we may give you a hospital gown.    **IMPORTANT** THE INSTRUCTIONS BELOW ARE ONLY FOR THOSE PATIENTS WHO HAVE BEEN TOLD THEY WILL RECEIVE SEDATION OR GENERAL ANESTHESIA DURING THEIR MRI PROCEDURE:  IF YOU WILL RECEIVE SEDATION (take medicine to help you relax during your exam):   You must get the medicine from your doctor before you arrive. Bring the medicine to the exam. Do not take it at home.   Arrive one hour early. Bring someone who can take you home after the test. Your medicine will make you sleepy. After the exam, you may not drive, take a bus or take a taxi by yourself.   No eating 8 hours before your exam. You may have clear liquids up until 4 hours before your exam. (Clear liquids include water, clear tea, black coffee and fruit juice without pulp.)  IF YOU WILL RECEIVE ANESTHESIA (be asleep for your exam):   Arrive 1 1/2 hours early. Bring someone who can take you home after the test. You may not drive, take a bus or take a taxi by yourself.   No eating 8 hours before your exam. You may have clear liquids up until 4 hours before your exam. (Clear liquids include water, clear tea, black coffee and fruit juice without pulp.)   You will spend four to five hours in the recovery room.  Please call the Imaging Department at your exam site with any questions.            Sep 18, 2017  7:40 AM CDT   Return Visit with Anant Pruitt DO   [facility])    [address] [place] [id number] [phone]            Sep 25, 2017  9:00 AM CDT   Return Visit with Obed Cooper MD   [facility])    [id number] [place] [id number] [phone]              Future tests that were ordered for you today     Open Future Orders        Priority Expected Expires Ordered    MR Sacrum and Coccyx w/o Contrast Routine  9/11/2018 9/11/2017            Who to contact     If you have questions or need follow up information about today's  "clinic visit or your schedule please contact [facility] directly at [phone].  Normal or non-critical lab and imaging results will be communicated to you by Cer[employer] Pharmaceuticalshart, letter or phone within 4 business days after the clinic has received the results. If you do not hear from us within 7 days, please contact the clinic through [employer] Pharmaceuticalshart or phone. If you have a critical or abnormal lab result, we will notify you by phone as soon as possible.  Submit refill requests through [employer] or call your pharmacy and they will forward the refill request to us. Please allow 3 business days for your refill to be completed.          Additional Information About Your Visit        Cerimon PharmaceuticalsharTruQC Information     [employer] is an electronic gateway that provides easy, online access to your medical records. With [employer], you can request a clinic appointment, read your test results, renew a prescription or communicate with your care team.     To sign up for [employer] visit the website at [email or web address]   You will be asked to enter the access code listed below, as well as some personal information. Please follow the directions to create your username and password.     Your access code is: [email or web address]  Expires: 2017 10:19 PM     Your access code will  in 90 days. If you need help or a new code, please contact your [facility] or call [phone] for assistance.        Care EveryWhere ID     This is your Care EveryWhere ID. This could be used by other organizations to access your [facility] medical records  [id number]        Your Vitals Were     Pulse Height Pulse Oximetry BMI (Body Mass Index)          94 1.854 m (6' 1\") 97% 32.32 kg/m2         Blood Pressure from Last 3 Encounters:   17 112/76   17 123/77   17 114/79    Weight from Last 3 Encounters:   17 111.1 kg (245 lb)   17 111.4 kg (245 lb 9.6 oz)   17 111.2 kg (245 lb 2 oz)               " Primary Care Provider Office Phone # Fax #    [facility] [phone]       [address] [place] [address]        Equal Access to Services     EVE KEMP : Anne luque gee Sohannah, waalexiada luqadaha, qaybta kaalmada graciela, valencia watson laEpifaniopete carlton. So [facility] [phone].    ATENCIÓN: Si habla español, tiene a diaz disposición servicios gratuitos de asistencia lingüística. Llame al [phone].    We comply with applicable federal civil rights laws and [place] laws. We do not discriminate on the basis of race, color, national origin, age, disability sex, sexual orientation or gender identity.            Thank you!     Thank you for choosing [facility]  for your care. Our goal is always to provide you with excellent care. Hearing back from our patients is one way we can continue to improve our services. Please take a few minutes to complete the written survey that you may receive in the mail after your visit with us. Thank you!             Your Updated Medication List - Protect others around you: Learn how to safely use, store and throw away your medicines at www.disposemymeds.org.          This list is accurate as of: 9/11/17  9:26 AM.  Always use your most recent med list.                   Brand Name Dispense Instructions for use Diagnosis    ACETAMINOPHEN PO       Penile pain, Epididymal pain       BENADRYL PO       Penile pain, Epididymal pain       [employer] FIBER GUMMIES PO       Penile pain, Epididymal pain       DILANTIN 100 MG CR capsule   Generic drug:  phenytoin      5 caps every am        GABAPENTIN PO       Penile pain, Epididymal pain       GLUCOSAMINE SULFATE PO      Take 1,000 mg by mouth daily    Penile pain, Epididymal pain       IBUPROFEN PO       Penile pain, Epididymal pain       MELATONIN PO       Penile pain, Epididymal pain

## 2017-09-11 NOTE — PATIENT INSTRUCTIONS
Thanks for coming today.  Ortho/Sports Medicine Clinic  [address] [place] [address]    To schedule future appointments in Ortho Clinic, you may call [phone].    To schedule ordered imaging by your provider:   Call Central Imaging [phone]    To schedule an injection ordered by your provider:  Call Central Imaging Injection scheduling line: [phone]  [email or web address] available online at:  [email or web address]    Please call if any further questions or concerns ([phone]).  Clinic hours 8 am to 5 pm.    Return to clinic (call) if symptoms worsen or fail to improve.

## 2017-09-12 ENCOUNTER — TELEPHONE (OUTPATIENT)
Dept: UROLOGY | Facility: CLINIC | Age: 52
End: 2017-09-12

## 2017-09-12 NOTE — TELEPHONE ENCOUNTER
Received [facility] regarding 08/25/2017 urine clean cath sample per Dr. Cooper there appear to be contaminants. There are common skin bacteria and don't infect healthy patients. No sign of infections. Pt informed verblized understanding no further questions or concerns at this time.

## 2017-09-14 ENCOUNTER — RADIANT APPOINTMENT (OUTPATIENT)
Dept: MRI IMAGING | Facility: CLINIC | Age: 52
End: 2017-09-14
Attending: FAMILY MEDICINE
Payer: COMMERCIAL

## 2017-09-14 DIAGNOSIS — M54.16 LUMBAR RADICULOPATHY: ICD-10-CM

## 2017-09-14 DIAGNOSIS — N48.89 PENILE PAIN: ICD-10-CM

## 2017-09-14 DIAGNOSIS — N50.819 TESTICULAR PAIN: ICD-10-CM

## 2017-09-14 PROCEDURE — 72195 MRI PELVIS W/O DYE: CPT | Performed by: RADIOLOGY

## 2017-09-18 ENCOUNTER — OFFICE VISIT (OUTPATIENT)
Dept: ORTHOPEDICS | Facility: CLINIC | Age: 52
End: 2017-09-18
Payer: COMMERCIAL

## 2017-09-18 VITALS — OXYGEN SATURATION: 98 % | SYSTOLIC BLOOD PRESSURE: 120 MMHG | DIASTOLIC BLOOD PRESSURE: 78 MMHG | HEART RATE: 80 BPM

## 2017-09-18 DIAGNOSIS — M54.17 RADICULOPATHY OF LUMBOSACRAL REGION: Primary | ICD-10-CM

## 2017-09-18 DIAGNOSIS — N48.89 PENILE PAIN: ICD-10-CM

## 2017-09-18 PROCEDURE — 99213 OFFICE O/P EST LOW 20 MIN: CPT | Performed by: FAMILY MEDICINE

## 2017-09-18 ASSESSMENT — PAIN SCALES - GENERAL: PAINLEVEL: NO PAIN (1)

## 2017-09-18 NOTE — PATIENT INSTRUCTIONS
Thanks for coming today.  Ortho/Sports Medicine Clinic  [address] [place] [address]    To schedule future appointments in Ortho Clinic, you may call [phone].    To schedule ordered imaging by your provider:   Call Central Imaging [phone]    To schedule an injection ordered by your provider:  Call Central Imaging Injection scheduling line: [phone]  APX Labshart available online at:  [email or web address]    Please call if any further questions or concerns ([phone]).  Clinic hours 8 am to 5 pm.    Return to clinic (call) if symptoms worsen or fail to improve.

## 2017-09-18 NOTE — NURSING NOTE
"Bijan Livingston's goals for this visit include: Discuss MRI results of sacrum   He requests these members of his care team be copied on today's visit information: yes    PCP: [facility]    Referring Provider:  ESTABLISHED PATIENT  No address on file    Chief Complaint   Patient presents with     Results     MR Sacrum from 09/14/2017       Initial /78 (BP Location: Left arm, Patient Position: Chair, Cuff Size: Adult Regular)  Pulse 80  SpO2 98% Estimated body mass index is 32.32 kg/(m^2) as calculated from the following:    Height as of 9/11/17: 1.854 m (6' 1\").    Weight as of 9/11/17: 111.1 kg (245 lb).  Medication Reconciliation: complete    "

## 2017-09-18 NOTE — MR AVS SNAPSHOT
After Visit Summary   2017    Bijan Livingston    MRN: [id number]           Patient Information     Date Of Birth          1965        Visit Information        Provider Department      2017 7:40 AM Anant Pruitt,  [facility]        Today's Diagnoses     Radiculopathy of lumbosacral region    -  1    Penile pain          Care Instructions    Thanks for coming today.  Ortho/Sports Medicine Clinic  [address] [place] [address]    To schedule future appointments in Ortho Clinic, you may call [phone].    To schedule ordered imaging by your provider:   Call Central Imaging [phone]    To schedule an injection ordered by your provider:  Call Central Imaging Injection scheduling line: [phone]  MyChart available online at:  [email or web address]    Please call if any further questions or concerns ([phone]).  Clinic hours 8 am to 5 pm.    Return to clinic (call) if symptoms worsen or fail to improve.            Follow-ups after your visit        Your next 10 appointments already scheduled     Sep 25, 2017  9:00 AM CDT   Return Visit with Obed Cooper MD   [facility]    [address] [place] [address] [phone]            Sep 28, 2017  8:00 AM CDT   XR NERVE BLOCK ROOT LUMBAR/SACRAL SINGLE LVL with MGXR3, MG NEURO RAD   [facility] ([facility])    [address] [place] [id number] [phone]           For nerve root injection, please send or bring copies of any MRIs or other scans you have had.  Bring a list of your current medicines to your exam. (Include vitamins, minerals and over-the-counter medicines.) Leave your valuables at home.  Plan to have someone drive you home afterward.  Stop taking the following medicines (but talk to your doctor first):   If you take blood thinners, you may need to stop  taking them a few days before treatment. Talk to your doctor before stopping these medicines.Stop taking Coumadin (warfarin) 3 days before treatment. Restart the day after treatment.   If you take Plavix, Ticlid, Pletal or Persantine, please ask your doctor if you should stop these medicines. You may need extra tests on the morning of your scan.   If you take Xarelto (Rivaroxaban), you may need to stop taking it 24 hours before treatment. Talk to your doctor before stopping this medicine. Restart the day after treatment.  You may take your other medicines as normal.  Stop all food and drink (including water) 3 hours before your test or treatment.  Please tell the doctor:   If you are allergic to X-ray dye (contrast fluid).   If you may be pregnant.  Injections take about 30 to 45 minutes. Most people spend up to 2 hours in the clinic or hospital.  Please call the Imaging Department at your exam site with any questions.              Future tests that were ordered for you today     Open Future Orders        Priority Expected Expires Ordered    XR Nerve Block Root Lum/Sac Single Lvl Routine 9/18/2017 9/18/2018 9/18/2017            Who to contact     If you have questions or need follow up information about today's clinic visit or your schedule please contact [facility] directly at [phone].  Normal or non-critical lab and imaging results will be communicated to you by Numascalehart, letter or phone within 4 business days after the clinic has received the results. If you do not hear from us within 7 days, please contact the clinic through Numascalehart or phone. If you have a critical or abnormal lab result, we will notify you by phone as soon as possible.  Submit refill requests through mydala or call your pharmacy and they will forward the refill request to us. Please allow 3 business days for your refill to be completed.          Additional Information About Your Visit        MyCharBig Think Information      [employer] is an electronic gateway that provides easy, online access to your medical records. With [employer], you can request a clinic appointment, read your test results, renew a prescription or communicate with your care team.     To sign up for [employer] visit the website at [email or web address]   You will be asked to enter the access code listed below, as well as some personal information. Please follow the directions to create your username and password.     Your access code is: [email or web address]  Expires: 2017  8:30 AM     Your access code will  in 90 days. If you need help or a new code, please contact your [facility] or call [phone] for assistance.        Care EveryWhere ID     This is your Care EveryWhere ID. This could be used by other organizations to access your [facility] medical records  [id number]        Your Vitals Were     Pulse Pulse Oximetry                80 98%           Blood Pressure from Last 3 Encounters:   17 120/78   17 112/76   17 123/77    Weight from Last 3 Encounters:   17 111.1 kg (245 lb)   17 111.4 kg (245 lb 9.6 oz)   17 111.2 kg (245 lb 2 oz)               Primary Care Provider Office Phone # Fax #    [facility] [phone]       [address] [place] [address]        Equal Access to Services     EVE KEMP AH: Hadii aad ku hadasho Soomaali, waaxda luqadaha, qaybta kaalmada adeegyada, waxay idiin hayaan eddi watson laabigail . So [facility] [phone].    ATENCIÓN: Si habla español, tiene a diaz disposición servicios gratuitos de asistencia lingüística. Llame al [phone].    We comply with applicable federal civil rights laws and [place] laws. We do not discriminate on the basis of race, color, national origin, age, disability sex, sexual orientation or gender identity.            Thank you!     Thank you for choosing [facility]  for your care. Our goal is always  to provide you with excellent care. Hearing back from our patients is one way we can continue to improve our services. Please take a few minutes to complete the written survey that you may receive in the mail after your visit with us. Thank you!             Your Updated Medication List - Protect others around you: Learn how to safely use, store and throw away your medicines at www.disposemymeds.org.          This list is accurate as of: 9/18/17  8:30 AM.  Always use your most recent med list.                   Brand Name Dispense Instructions for use Diagnosis    ACETAMINOPHEN PO       Penile pain, Epididymal pain       BENADRYL PO       Penile pain, Epididymal pain       CVS FIBER GUMMIES PO       Penile pain, Epididymal pain       DILANTIN 100 MG CR capsule   Generic drug:  phenytoin      5 caps every am        GABAPENTIN PO       Penile pain, Epididymal pain       GLUCOSAMINE SULFATE PO      Take 1,000 mg by mouth daily    Penile pain, Epididymal pain       IBUPROFEN PO       Penile pain, Epididymal pain       MELATONIN PO       Penile pain, Epididymal pain

## 2017-09-18 NOTE — PROGRESS NOTES
HISTORY OF PRESENT ILLNESS  Mr. Livingston is a pleasant 51 year old year old male following up with testicular and penile pain.  He's here to review his sacral MRI.  Additional history: as documented      REVIEW OF SYSTEMS (9/18/2017)  10 point ROS of systems including Constitutional, Eyes, Respiratory, Cardiovascular, Gastroenterology, Genitourinary, Integumentary, Musculoskeletal, Psychiatric were all negative except for pertinent positives noted in my HPI.     PHYSICAL EXAM  Vitals:    09/18/17 0748   BP: 120/78   BP Location: Left arm   Patient Position: Chair   Cuff Size: Adult Regular   Pulse: 80   SpO2: 98%       ASSESSMENT & PLAN  Mr. Livingston is a 51 year old year old male following up with penile and testicular pain.    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 Consider correlation for L5  radiculopathy.    I had a long discussion with Bijan centering around these findings.  L5 radiculopathy likely does not explain his testicular or penile pain.  The corresponding dermatomal levels that may explain this type of pain or either at the L1 level or S2 and S3.  I did explain to him that it is possible that the MRI could potentially miss a nerve impingement at these levels, particularly the sacral levels.    I'm referring him to have this done at his earliest convenience.    Bijan's going to let me know how he's doing after he gets his injection.  If ineffective this will help us quite a bit, as this likely rules out a radiculopathy as his primary pain generator.    It was a pleasure seeing Bijan.    20 minutes was spent in the room, 15 of which was spent on counseling and coordination of care.        Anant Pruitt DO, CAQSM

## 2017-09-28 ENCOUNTER — RADIANT APPOINTMENT (OUTPATIENT)
Dept: GENERAL RADIOLOGY | Facility: CLINIC | Age: 52
End: 2017-09-28
Attending: FAMILY MEDICINE
Payer: COMMERCIAL

## 2017-09-28 VITALS — DIASTOLIC BLOOD PRESSURE: 74 MMHG | OXYGEN SATURATION: 99 % | HEART RATE: 78 BPM | SYSTOLIC BLOOD PRESSURE: 127 MMHG

## 2017-09-28 DIAGNOSIS — N48.89 PENILE PAIN: ICD-10-CM

## 2017-09-28 DIAGNOSIS — M54.17 RADICULOPATHY OF LUMBOSACRAL REGION: ICD-10-CM

## 2017-09-28 PROCEDURE — 64483 NJX AA&/STRD TFRM EPI L/S 1: CPT | Performed by: RADIOLOGY

## 2017-09-28 PROCEDURE — S0020 INJECTION, BUPIVICAINE HYDRO: HCPCS | Performed by: FAMILY MEDICINE

## 2017-09-28 RX ORDER — METHYLPREDNISOLONE ACETATE 80 MG/ML
80 INJECTION, SUSPENSION INTRA-ARTICULAR; INTRALESIONAL; INTRAMUSCULAR; SOFT TISSUE ONCE
Status: COMPLETED | OUTPATIENT
Start: 2017-09-28 | End: 2017-09-28

## 2017-09-28 RX ORDER — BUPIVACAINE HYDROCHLORIDE 5 MG/ML
10 INJECTION, SOLUTION PERINEURAL ONCE
Status: COMPLETED | OUTPATIENT
Start: 2017-09-28 | End: 2017-09-28

## 2017-09-28 RX ORDER — IOPAMIDOL 408 MG/ML
10 INJECTION, SOLUTION INTRATHECAL ONCE
Status: COMPLETED | OUTPATIENT
Start: 2017-09-28 | End: 2017-09-28

## 2017-09-28 RX ADMIN — IOPAMIDOL 5 ML: 408 INJECTION, SOLUTION INTRATHECAL at 08:33

## 2017-09-28 RX ADMIN — BUPIVACAINE HYDROCHLORIDE 20 MG: 5 INJECTION, SOLUTION PERINEURAL at 08:33

## 2017-09-28 RX ADMIN — METHYLPREDNISOLONE ACETATE 80 MG: 80 INJECTION, SUSPENSION INTRA-ARTICULAR; INTRALESIONAL; INTRAMUSCULAR; SOFT TISSUE at 08:34

## 2017-09-28 NOTE — PROGRESS NOTES
: Bijan Livingston was seen in X-ray today for a sacral nerve root injection. Patient rated pain before procedure 1-2/10. After procedure patient rated pain 0/10. This pain level is acceptable to patient. Patient discharged home with his Wife.

## 2017-10-12 ENCOUNTER — TELEPHONE (OUTPATIENT)
Dept: UROLOGY | Facility: CLINIC | Age: 52
End: 2017-10-12

## 2017-10-12 NOTE — TELEPHONE ENCOUNTER
The patient was contacted and informed of Dr. Cooper's recommendations below. The patient verbalized understanding and would like to follow up in clinic to discuss surgery options further. Patient reports he has already had a cord block and that did not help. Patient would like to be contacted with any cancellations in order to get in sooner.     Erika Alba CMA     His MRI from last month shows a herniated disc.  He should get management for this and pain medications from ortho or his regular doctor, preferably.  I think that's what's causing his pain.     For the right testis pain , I might offer a surgery for microscopic denervation of the spermatic cord.  I do a cord block in the office first to get and idea of how he would respond to surgery.   (Routing comment)

## 2017-10-12 NOTE — TELEPHONE ENCOUNTER
The patient is calling to report his penile pain is still present and he is wondering what to do. He has been taking Oxycodone from an old prescription he had but is almost out of pills. The patient was informed that Dr. Cooper is out of clinic until 10/16/17 and that a narcotic pain prescription would require a physical hard copy. The patient was informed that a message would be sent to Dr. Cooper to review and advise but he was encouraged to follow up with his PCP or in urgent care if his pain is unbearable. The patient verbalized understanding.    Erika Alba CMA

## 2017-11-08 ENCOUNTER — OFFICE VISIT (OUTPATIENT)
Dept: UROLOGY | Facility: CLINIC | Age: 52
End: 2017-11-08
Payer: COMMERCIAL

## 2017-11-08 VITALS — HEART RATE: 76 BPM | DIASTOLIC BLOOD PRESSURE: 80 MMHG | SYSTOLIC BLOOD PRESSURE: 113 MMHG

## 2017-11-08 DIAGNOSIS — N45.1 EPIDIDYMITIS: Primary | ICD-10-CM

## 2017-11-08 PROCEDURE — 99213 OFFICE O/P EST LOW 20 MIN: CPT | Performed by: UROLOGY

## 2017-11-08 RX ORDER — GRANULES FOR ORAL 3 G/1
3 POWDER ORAL ONCE
Qty: 6 PACKET | Refills: 0 | Status: SHIPPED | OUTPATIENT
Start: 2017-11-08 | End: 2017-11-08

## 2017-11-08 RX ORDER — SULFAMETHOXAZOLE/TRIMETHOPRIM 800-160 MG
1 TABLET ORAL 2 TIMES DAILY
Qty: 30 TABLET | Refills: 2 | Status: SHIPPED | OUTPATIENT
Start: 2017-11-08 | End: 2017-11-08

## 2017-11-08 RX ORDER — GRANULES FOR ORAL 3 G/1
3 POWDER ORAL
Qty: 6 PACKET | Refills: 0 | Status: SHIPPED | OUTPATIENT
Start: 2017-11-08 | End: 2017-11-08

## 2017-11-08 RX ORDER — HYDROCODONE BITARTRATE AND ACETAMINOPHEN 5; 325 MG/1; MG/1
TABLET ORAL
Refills: 0 | COMMUNITY
Start: 2017-10-19

## 2017-11-08 RX ORDER — GRANULES FOR ORAL 3 G/1
3 POWDER ORAL
Qty: 6 PACKET | Refills: 0 | Status: SHIPPED | OUTPATIENT
Start: 2017-11-08 | End: 2018-01-03

## 2017-11-08 ASSESSMENT — PAIN SCALES - GENERAL: PAINLEVEL: NO PAIN (1)

## 2017-11-08 NOTE — PROGRESS NOTES
Pt here to follow-up bilateral testis pain.    He states overall his pain is pretty minimal at present. 1-2/10 in severity.  He takes gabapentin.    Has tried several different antibiotics in the past.  Doxycycline  Bactrim   Cipro.    He's had severe tendonitis from the Cipro that has 100% resolved and should always avoid FQ antibiotics from now on.    He did have radiculopathy investigated- MRI showed a bulging disc.  SI injection did help his pain.      Exam today shows bilateral testes nontender.  Right epididymis essentially nontender today.  Left epididymis essentially nontender but does have a 1cm epididymal head cyst that is tender to palpation.    Discussed cord block, but he declines today.    Discussed surgical options of epididymectomy, microscopic denervation of the spermatic cord, orchiectomy.    Discussed nonsurgical options of acupuncture, pain management Dr. Flores, antibiotics trial again, etc.    He wants to try antibiotics for now.  Will go for fosfomycin to try something different.  He was wondering about Bactrim but this would interfere with Dilantin levels.    20 min visit, over 50% face to face in counseling/discussion of above issues.     PRN follow-up.    Reji LUNA

## 2017-11-08 NOTE — MR AVS SNAPSHOT
After Visit Summary   2017    Bijan Livingston    MRN: [id number]           Patient Information     Date Of Birth          1965        Visit Information        Provider Department      2017 11:30 AM Obed Cooper MD [facility]        Today's Diagnoses     Epididymitis    -  1       Follow-ups after your visit        Who to contact     If you have questions or need follow up information about today's clinic visit or your schedule please contact [facility] directly at [phone].  Normal or non-critical lab and imaging results will be communicated to you by MyChart, letter or phone within 4 business days after the clinic has received the results. If you do not hear from us within 7 days, please contact the clinic through [employer] or phone. If you have a critical or abnormal lab result, we will notify you by phone as soon as possible.  Submit refill requests through [employer] or call your pharmacy and they will forward the refill request to us. Please allow 3 business days for your refill to be completed.          Additional Information About Your Visit        MyChart Information     [employer] is an electronic gateway that provides easy, online access to your medical records. With [employer] you can request a clinic appointment, read your test results, renew a prescription or communicate with your care team.     To sign up for [employer] visit the website at [email or web address]   You will be asked to enter the access code listed below, as well as some personal information. Please follow the directions to create your username and password.     Your access code is: [email or web address]  Expires: 2017  7:30 AM     Your access code will  in 90 days. If you need help or a new code, please contact your [facility] or call [phone] for assistance.        Care EveryWhere ID     This is your Care EveryWhere ID. This could  be used by other organizations to access your [place] medical records  [id number]        Your Vitals Were     Pulse                   76            Blood Pressure from Last 3 Encounters:   11/08/17 113/80   09/28/17 127/74   09/18/17 120/78    Weight from Last 3 Encounters:   09/11/17 111.1 kg (245 lb)   08/23/17 111.4 kg (245 lb 9.6 oz)   08/06/17 111.2 kg (245 lb 2 oz)              Today, you had the following     No orders found for display         Today's Medication Changes          These changes are accurate as of: 11/8/17 12:46 PM.  If you have any questions, ask your nurse or doctor.               Start taking these medicines.        Dose/Directions    fosfomycin 3 G Packet   Commonly known as:  MONUROL   Used for:  Epididymitis   Started by:  Obed Cooper MD        Dose:  3 g   Take 1 packet (3 g) by mouth every 72 hours   Quantity:  6 packet   Refills:  0            Where to get your medicines      Some of these will need a paper prescription and others can be bought over the counter.  Ask your nurse if you have questions.     Bring a paper prescription for each of these medications     fosfomycin 3 G Packet                Primary Care Provider Office Phone # Fax #    [facility] [phone]       [address] [place] [address]        Equal Access to Services     EVE KEMP AH: Hadii theo ku hadasho Soomaali, waaxda luqadaha, qaybta kaalmada adeegyada, valencia lara haypete carlton. So [facility] [phone].    ATENCIÓN: Si habla español, tiene a diaz disposición servicios gratuitos de asistencia lingüística. Llame al [phone].    We comply with applicable federal civil rights laws and [place] laws. We do not discriminate on the basis of race, color, national origin, age, disability, sex, sexual orientation, or gender identity.            Thank you!     Thank you for choosing [facility]  for your care. Our goal is always to provide you with  excellent care. Hearing back from our patients is one way we can continue to improve our services. Please take a few minutes to complete the written survey that you may receive in the mail after your visit with us. Thank you!             Your Updated Medication List - Protect others around you: Learn how to safely use, store and throw away your medicines at www.disposemymeds.org.          This list is accurate as of: 11/8/17 12:46 PM.  Always use your most recent med list.                   Brand Name Dispense Instructions for use Diagnosis    ACETAMINOPHEN PO       Penile pain, Epididymal pain       BENADRYL PO       Penile pain, Epididymal pain       [employer] FIBER GUMMIES PO       Penile pain, Epididymal pain       DILANTIN 100 MG CR capsule   Generic drug:  phenytoin      5 caps every am        fosfomycin 3 G Packet    MONUROL    6 packet    Take 1 packet (3 g) by mouth every 72 hours    Epididymitis       GABAPENTIN PO       Penile pain, Epididymal pain       GLUCOSAMINE SULFATE PO      Take 1,000 mg by mouth daily    Penile pain, Epididymal pain       HYDROcodone-acetaminophen 5-325 MG per tablet    NORCO          IBUPROFEN PO       Penile pain, Epididymal pain       MELATONIN PO       Penile pain, Epididymal pain

## 2017-11-08 NOTE — NURSING NOTE
"Bijan Livingston's goals for this visit include:   Chief Complaint   Patient presents with     RECHECK     He requests these members of his care team be copied on today's visit information: None    Initial /80 (BP Location: Left arm, Patient Position: Chair, Cuff Size: Adult Large)  Pulse 76 Estimated body mass index is 32.32 kg/(m^2) as calculated from the following:    Height as of 9/11/17: 1.854 m (6' 1\").    Weight as of 9/11/17: 111.1 kg (245 lb).  BP completed using cuff size: large        "

## 2017-12-22 ENCOUNTER — TELEPHONE (OUTPATIENT)
Dept: UROLOGY | Facility: CLINIC | Age: 52
End: 2017-12-22

## 2017-12-22 NOTE — TELEPHONE ENCOUNTER
Fax received from the [facility] stating that the fosfomycin (MONUROL) 3 G Packet prescribed by Dr. Cooper is going to cost the patient $150. They are wondering if there is a cheaper alternative available?    Erika Alba CMA

## 2017-12-22 NOTE — TELEPHONE ENCOUNTER
Will forward message to Dr. Cooper to review and give recommendations.    Patricia Reyes RN, BSN

## 2017-12-26 NOTE — TELEPHONE ENCOUNTER
Received message from Dr. Cooper stating that the patient has tried all of the other common antibiotics and anything different would be repeating what he already tried. Per Dr. Cooper, it is up to the patient if he wants to spend the money and try the fosfomycin, otherwise we can try for a prior authorization or tier reduction.    Called and spoke to patient who is aware of the above information. Patient reports that he picked up the fosfomycin and has already started taking it. Informed patient to call with any questions or concerns. Patient verbalized understanding.    Patricia Reyes RN, BSN

## 2018-01-03 ENCOUNTER — TELEPHONE (OUTPATIENT)
Dept: UROLOGY | Facility: CLINIC | Age: 53
End: 2018-01-03

## 2018-01-03 DIAGNOSIS — N45.1 EPIDIDYMITIS: ICD-10-CM

## 2018-01-03 RX ORDER — GRANULES FOR ORAL 3 G/1
3 POWDER ORAL
Qty: 1 PACKET | Refills: 0 | Status: SHIPPED | OUTPATIENT
Start: 2018-01-03

## 2018-01-03 NOTE — TELEPHONE ENCOUNTER
Discussed with Dr. Cooper with recommendation for the prescription to be updated to include 1 additional packet making it a 21 day course of treatment.    Called and spoke to patient who is aware of the above information. New prescription for 1 additional packet of fosfomycin ordered per verbal order from Dr. Cooper. Prescription sent to [facility] in [place] per patient request.    Patricia Reyes RN, BSN

## 2018-01-03 NOTE — TELEPHONE ENCOUNTER
[facility] Call Center    Phone Message    Name of Caller: Bijan    Phone Number: Home number on file [phone] (home)    Best time to return call: Any    May a detailed message be left on voicemail: yes    Relation to patient: Self    Reason for Call: Medication Question or concern regarding medication   Prescription Clarification: fosfomycin (MONUROL) 3 G Packet [96332] (Order [id number])     Name of Medication: fosfomycin (MONUROL) 3 G Packet [76651] (Order [id number])     Prescribing Provider: Dr. Cooper   Pharmacy: [facility]   What on the order needs clarification? Patient would like clarification on the number of days that he should be taking this medication? He was under the impression that it was to be taken for 21 days but the pharmacy stated 18 days and did not give him enough. Please advise.             Action Taken: Message routed to:  Adult Clinics: Urology p [id number]

## 2018-01-11 ENCOUNTER — TELEPHONE (OUTPATIENT)
Dept: UROLOGY | Facility: CLINIC | Age: 53
End: 2018-01-11

## 2018-01-11 NOTE — TELEPHONE ENCOUNTER
"[facility] Call Center    Phone Message    Name of Caller: Bijan    Phone Number: Home number on file [phone] (home) or Cell number on file:    Telephone Information:   Mobile none       Best time to return call: Any    May a detailed message be left on voicemail: yes    Relation to patient: Self    Reason for Call: Medication Question or concern regarding medication   Prescription Clarification:   Name of Medication: fosfomycin (MONUROL) 3 G Packet [26626] (Order [id number])     Prescribing Provider: Dr. Cooper   Pharmacy: [facility] [address] [place] [address]   What on the order needs clarification?     Is patient symptomatic? Yes.     Does patient have any of the following \"red flag\" symptoms: None    Does patient have any \"Red Flag\" symptoms? No.     Current symptom or concern: Patient has completed Rx as of last evening and states symptoms have not resolved and wanted to discuss with nurse what is recommended next for him possible a long dose of antibiotics. Patient stated he tolerated it well, only having some diarrhea the next day. Patient states that if  The same Rx will be prescribed he will need to refill completed no later then Saturday to stay on the current regimen.   Please advise.         Action Taken: Message routed to:  Adult Clinics: Urology p [id number]    "

## 2018-01-15 ENCOUNTER — OFFICE VISIT (OUTPATIENT)
Dept: UROLOGY | Facility: CLINIC | Age: 53
End: 2018-01-15
Payer: COMMERCIAL

## 2018-01-15 VITALS
DIASTOLIC BLOOD PRESSURE: 79 MMHG | HEART RATE: 84 BPM | BODY MASS INDEX: 28.76 KG/M2 | WEIGHT: 217 LBS | SYSTOLIC BLOOD PRESSURE: 110 MMHG | HEIGHT: 73 IN | OXYGEN SATURATION: 96 % | TEMPERATURE: 96.7 F

## 2018-01-15 DIAGNOSIS — N28.1 ACQUIRED CYST OF KIDNEY: Primary | ICD-10-CM

## 2018-01-15 PROCEDURE — 99213 OFFICE O/P EST LOW 20 MIN: CPT | Performed by: UROLOGY

## 2018-01-15 ASSESSMENT — PAIN SCALES - GENERAL: PAINLEVEL: MODERATE PAIN (5)

## 2018-01-15 NOTE — NURSING NOTE
".  Bijan Livingston's goals for this visit include: discuss urinary discharge  He requests these members of his care team be copied on today's visit information: no    PCP: [facility] [place]    Referring Provider:  No referring provider defined for this encounter.    Chief Complaint   Patient presents with     RECHECK     Epididymitis       Initial /79 (BP Location: Left arm, Patient Position: Sitting, Cuff Size: Adult Large)  Pulse 84  Temp 96.7  F (35.9  C) (Oral)  Ht 1.854 m (6' 1\")  Wt 98.4 kg (217 lb)  SpO2 96%  BMI 28.63 kg/m2 Estimated body mass index is 28.63 kg/(m^2) as calculated from the following:    Height as of this encounter: 1.854 m (6' 1\").    Weight as of this encounter: 98.4 kg (217 lb).  Medication Reconciliation: complete    Do you need any medication refills at today's visit? No    Kadie Meek LPN    "

## 2018-01-15 NOTE — PATIENT INSTRUCTIONS
CYSTOSCOPY    What is a Cystoscopy?  This is a procedure done to check for problems inside the bladder.  Problems may include polyps (growths), tumors, inflammation (swelling and redness) and other concerns.    The doctor inserts a thin tube (called a cystoscope) into the bladder.  The tube is about the size of a pencil.  We will give you numbing medicine to reduce the pain or discomfort you may feel.    The tube allows the doctor to:  The doctor will be able to see inside the bladder by filling the bladder with water.  The water makes it easier to see any problems that may be present.    If needed, the doctor may use the tube to:  The doctor is able to take tissue samples (biopsies).  Samples are sent to the lab for testing.  The doctor can also burn off any small growths or tumors that are found.  This is call fulguration.    How should I get ready for the exam?  To prepare, stop taking any medications as instructed. Ask whether you should avoid eating or drinking anything after midnight before the procedure. Follow any other instructions your doctor gives you.    If you are having this procedure done at the clinic, you will be there for up to an hour.  You will receive care before and after the procedure.    Please tell your doctor if:  - You have a history of urinary tract infections.  - You know that you have a tumor in your bladder.  - You have bleeding problems.  - You have any allergies.  - You are or may be pregnant.      What happens after the exam?  You may go back to your normal diet and activity as you feel ready, unless your doctor tells you not to.    For the next two days, you may notice:  - Some blood in your urine.  - Some burning when you urinate (use the toilet).  - An urge to urinate more often.  - Bladder spasms.    These are normal after the procedure. They should go away on their own after a day or two.      - You can help to relieve the above listed symptoms by:  - Drinking 6 to 8 large  glasses of water each day (includes drinks at meals).  This will help clear the urine.  - Take warm baths to relieve pain and bladder spasms.  Do not add anything to the bath water.  - Your doctor may prescribe pain medicine.  You may also take Tylenol (acetaminophen) for pain.    When should I call my doctor?  - A fever over 100.0 F (38 C) for more than a day.  (Before you call the doctor, check your temperature under your tongue.)  - Chills.  - Failure to urinate: No urine comes out when you try to use the toilet.  (Try soaking in a bathtub full of warm water.  If still no urine, call your doctor.)  - A lot of blood in the urine or blood clots larger than a nickel.  - Pain in the back or abdomen (belly / stomach area).  - Pain or spasms that are not relieved by warm tub baths and pain medicine.  - Severe pain, burning or other problems while passing urine.  - Pain that gets worse after two days.

## 2018-01-15 NOTE — MR AVS SNAPSHOT
After Visit Summary   1/15/2018    Bijan Livingston    MRN: [id number]           Patient Information     Date Of Birth          1965        Visit Information        Provider Department      1/15/2018 11:00 AM Obed Cooper MD [facility]        Today's Diagnoses     Acquired cyst of kidney    -  1      Care Instructions    CYSTOSCOPY    What is a Cystoscopy?  This is a procedure done to check for problems inside the bladder.  Problems may include polyps (growths), tumors, inflammation (swelling and redness) and other concerns.    The doctor inserts a thin tube (called a cystoscope) into the bladder.  The tube is about the size of a pencil.  We will give you numbing medicine to reduce the pain or discomfort you may feel.    The tube allows the doctor to:  The doctor will be able to see inside the bladder by filling the bladder with water.  The water makes it easier to see any problems that may be present.    If needed, the doctor may use the tube to:  The doctor is able to take tissue samples (biopsies).  Samples are sent to the lab for testing.  The doctor can also burn off any small growths or tumors that are found.  This is call fulguration.    How should I get ready for the exam?  To prepare, stop taking any medications as instructed. Ask whether you should avoid eating or drinking anything after midnight before the procedure. Follow any other instructions your doctor gives you.    If you are having this procedure done at the clinic, you will be there for up to an hour.  You will receive care before and after the procedure.    Please tell your doctor if:  - You have a history of urinary tract infections.  - You know that you have a tumor in your bladder.  - You have bleeding problems.  - You have any allergies.  - You are or may be pregnant.      What happens after the exam?  You may go back to your normal diet and activity as you feel ready, unless your doctor tells you  not to.    For the next two days, you may notice:  - Some blood in your urine.  - Some burning when you urinate (use the toilet).  - An urge to urinate more often.  - Bladder spasms.    These are normal after the procedure. They should go away on their own after a day or two.      - You can help to relieve the above listed symptoms by:  - Drinking 6 to 8 large glasses of water each day (includes drinks at meals).  This will help clear the urine.  - Take warm baths to relieve pain and bladder spasms.  Do not add anything to the bath water.  - Your doctor may prescribe pain medicine.  You may also take Tylenol (acetaminophen) for pain.    When should I call my doctor?  - A fever over 100.0 F (38 C) for more than a day.  (Before you call the doctor, check your temperature under your tongue.)  - Chills.  - Failure to urinate: No urine comes out when you try to use the toilet.  (Try soaking in a bathtub full of warm water.  If still no urine, call your doctor.)  - A lot of blood in the urine or blood clots larger than a nickel.  - Pain in the back or abdomen (belly / stomach area).  - Pain or spasms that are not relieved by warm tub baths and pain medicine.  - Severe pain, burning or other problems while passing urine.  - Pain that gets worse after two days.            Follow-ups after your visit        Additional Services     [facility]       Chronic penis and testis pain    Please call [phone] to make an appointment. Clinic is located: [facility] [address] [place] [address] [id number] [place] [address]      Please complete the following questions:    Procedure/Referral: evaluate and treat    What is your diagnosis for the patient's pain? ?radiculopathy    What are your specific questions for the pain specialist? Can you fix his penile, prostate, testes pain.  Is this pain from sacral/lumbar radiculopathy    Are there any red flags that may impact the assessment  or management of the patient? None                  Follow-up notes from your care team     Return if symptoms worsen or fail to improve.      Your next 10 appointments already scheduled     Jan 17, 2018  7:20 AM CST   US RENAL COMPLETE with [facility] US TECH   [facility]    [address] [place] [id number] [phone]           Please bring a list of your medicines (including vitamins, minerals and over-the-counter drugs). Also, tell your doctor about any allergies you may have. Wear comfortable clothes and leave your valuables at home.  You do not need to do anything special to prepare for your exam.  Please call the Imaging Department at your exam site with any questions.            Jan 31, 2018  8:30 AM CST   CYSTO with Obed Cooper MD   [facility]    [address] [address] [place] [id number] [phone]              Future tests that were ordered for you today     Open Future Orders        Priority Expected Expires Ordered    Renal US Routine  1/15/2019 1/15/2018            Who to contact     If you have questions or need follow up information about today's clinic visit or your schedule please contact [facility] directly at [phone].  Normal or non-critical lab and imaging results will be communicated to you by MyChart, letter or phone within 4 business days after the clinic has received the results. If you do not hear from us within 7 days, please contact the clinic through MyChart or phone. If you have a critical or abnormal lab result, we will notify you by phone as soon as possible.  Submit refill requests through [employer] or call your pharmacy and they will forward the refill request to us. Please allow 3 business days for your refill to be completed.          Additional Information About Your Visit        [employer] Information     [employer] is an electronic  "gateway that provides easy, online access to your medical records. With [employer], you can request a clinic appointment, read your test results, renew a prescription or communicate with your care team.     To sign up for [employer] visit the website at [email or web address]   You will be asked to enter the access code listed below, as well as some personal information. Please follow the directions to create your username and password.     Your access code is: [email or web address]  Expires: 4/15/2018 11:54 AM     Your access code will  in 90 days. If you need help or a new code, please contact your [facility] or call [phone] for assistance.        Care EveryWhere ID     This is your Care EveryWhere ID. This could be used by other organizations to access your [place] medical records  [id number]        Your Vitals Were     Pulse Temperature Height Pulse Oximetry BMI (Body Mass Index)       84 96.7  F (35.9  C) (Oral) 1.854 m (6' 1\") 96% 28.63 kg/m2        Blood Pressure from Last 3 Encounters:   01/15/18 110/79   17 113/80   17 127/74    Weight from Last 3 Encounters:   01/15/18 98.4 kg (217 lb)   17 111.1 kg (245 lb)   17 111.4 kg (245 lb 9.6 oz)              We Performed the Following     [facility] REFERRAL        Primary Care Provider Office Phone # Fax #    [facility] [phone]       [address] [place] [address]        Equal Access to Services     [facility] : Hadii aad ku hadasho Soomaali, waaxda luqadaha, qaybta kaalmada adeegyada, valencia carlton. So [facility] [phone].    ATENCIÓN: Si habla español, tiene a diaz disposición servicios gratuitos de asistencia lingüística. Llame al [phone].    We comply with applicable federal civil rights laws and [place] laws. We do not discriminate on the basis of race, color, national origin, age, disability, sex, sexual " orientation, or gender identity.            Thank you!     Thank you for choosing [facility]  for your care. Our goal is always to provide you with excellent care. Hearing back from our patients is one way we can continue to improve our services. Please take a few minutes to complete the written survey that you may receive in the mail after your visit with us. Thank you!             Your Updated Medication List - Protect others around you: Learn how to safely use, store and throw away your medicines at www.disposemymeds.org.          This list is accurate as of: 1/15/18 11:54 AM.  Always use your most recent med list.                   Brand Name Dispense Instructions for use Diagnosis    ACETAMINOPHEN PO       Penile pain, Epididymal pain       BENADRYL PO       Penile pain, Epididymal pain       [employer] FIBER GUMMIES PO       Penile pain, Epididymal pain       DILANTIN 100 MG CR capsule   Generic drug:  phenytoin      5 caps every am        fosfomycin 3 G Packet    MONUROL    1 packet    Take 1 packet (3 g) by mouth every 72 hours    Epididymitis       GABAPENTIN PO       Penile pain, Epididymal pain       GLUCOSAMINE SULFATE PO      Take 1,000 mg by mouth daily    Penile pain, Epididymal pain       HYDROcodone-acetaminophen 5-325 MG per tablet    NORCO          IBUPROFEN PO       Penile pain, Epididymal pain       MELATONIN PO       Penile pain, Epididymal pain

## 2018-01-15 NOTE — TELEPHONE ENCOUNTER
Pt scheduled an appointment for today to discuss with Dr. Cooper.    Jannie Valle RN, BSN  [facility]  GI/Gen Surg/Hepatology Care Coordinator

## 2018-01-15 NOTE — PROGRESS NOTES
Here to follow-up pelvic pain    He states overall his pain is pretty minimal at present. 1-2/10 in severity.  Comes and goes.    Full bladder or rectum worsen the pain.    Pt has had a bit of initial gross hematuria/ clot noted.    Penile pain is most bothersome to him.    Takes gabapentin which he says does help with sleep.    Has tried several different antibiotics in the past.    Doxycycline    Bactrim     Cipro.       --- He's had severe tendonitis from the Cipro that has 100% resolved and should always avoid FQ antibiotics from now on.    Fosfomycin    He did have radiculopathy investigated- MRI showed a bulging disc.  SI injection 9/28/17 did help his pain.    Sacral MRI 9/14/17  Degenerative disease of the lower lumbar spine, with preseptal disc  herniation abutting the right nerve root. Consider correlation for L5  Radiculopathy.    Lumbar MRI 8/31/17   IMPRESSION: Multilevel degenerative disc disease with the most  significant findings noted at L3-L4 where there is small left  foraminal protrusion resulting in mild to moderate left foraminal  Narrowing.    CT abdomen.pelvis 8/7/17 at [facility]  1.  No acute intra-abdominal abnormality identified.  2.  The penis is not well evaluated on CT examination. MRI with and without contrast would be a more helpful examination.  3.  No lymphadenopathy or intra-abdominal mass identified.  4.  Status post appendectomy.  5.  Left renal inferior pole mild dense lesion may represent a complex cyst or proteinaceous cyst. A renal Ultrasound can be done for further evaluation.    Plan  - return to clinic for cystoscopy  - renal US to follow-up renlal cyst seen on outside facility CT scan.      Renal US to follow-up hyperdense cyst was done 1/17/18  IMPRESSION:  1.  No defined renal cysts.

## 2018-01-17 ENCOUNTER — RADIANT APPOINTMENT (OUTPATIENT)
Dept: ULTRASOUND IMAGING | Facility: CLINIC | Age: 53
End: 2018-01-17
Attending: UROLOGY
Payer: COMMERCIAL

## 2018-01-17 DIAGNOSIS — N28.1 ACQUIRED CYST OF KIDNEY: ICD-10-CM

## 2018-01-17 PROCEDURE — 76770 US EXAM ABDO BACK WALL COMP: CPT | Performed by: RADIOLOGY

## 2018-01-18 NOTE — PROGRESS NOTES
Please contact patient with these normal results.    The left renal cyst was not seen on this renal US, but it was there on previous CT scans.  It appears benign, and has not changed in size from 2011 CT to 2017 CT, so I think this spot is safe to ignore.    Thanks!  GRETCHEN Cooper MD

## 2018-01-18 NOTE — TELEPHONE ENCOUNTER
APPT INFO    Date /Time: 1/29/18 9:40AM    Reason for Appt: Chronic penis and testis pain    Ref Provider/Clinic: [facility]    Are there internal records? Yes/No?  IF YES, list clinic names: [facility] (referring) / Images in PACS   [facility] Emergency Note 7/23/17    Are there outside records? Yes/No? Yes    Patient Contact (Y/N) & Call Details: No, pt was referred.    Action: Closing encounter      OUTSIDE RECORDS CHECKLIST     CLINIC NAME COMMENTS REC (x) IMG (x)   [facility]  Transferred recs scanned in Epic 8/10/17 X None   [facility] Progress notes scanned in Epic 7/24/17   US Testicular 6/29/17 (IN PACS)   [facility] 6/16/17  X X

## 2018-01-19 ENCOUNTER — TELEPHONE (OUTPATIENT)
Dept: UROLOGY | Facility: CLINIC | Age: 53
End: 2018-01-19

## 2018-01-19 NOTE — TELEPHONE ENCOUNTER
Attempted to reach patient, but no answer. Left generic message with request for patient to return call back to clinic. When patient returns call, will inform him of the 1/17/18 renal ultrasound results, result note and recommendations from Dr. Cooper.    aPtricia Reyes RN, BSN

## 2018-01-29 ENCOUNTER — PRE VISIT (OUTPATIENT)
Dept: ANESTHESIOLOGY | Facility: CLINIC | Age: 53
End: 2018-01-29

## 2018-02-07 ENCOUNTER — OFFICE VISIT (OUTPATIENT)
Dept: UROLOGY | Facility: CLINIC | Age: 53
End: 2018-02-07
Payer: COMMERCIAL

## 2018-02-07 VITALS — DIASTOLIC BLOOD PRESSURE: 74 MMHG | SYSTOLIC BLOOD PRESSURE: 122 MMHG | HEART RATE: 80 BPM | OXYGEN SATURATION: 98 %

## 2018-02-07 DIAGNOSIS — R31.0 GROSS HEMATURIA: Primary | ICD-10-CM

## 2018-02-07 LAB
ALBUMIN UR-MCNC: NEGATIVE MG/DL
APPEARANCE UR: CLEAR
BILIRUB UR QL STRIP: NEGATIVE
COLOR UR AUTO: NORMAL
GLUCOSE UR STRIP-MCNC: NEGATIVE MG/DL
HGB UR QL STRIP: NEGATIVE
KETONES UR STRIP-MCNC: NEGATIVE MG/DL
LEUKOCYTE ESTERASE UR QL STRIP: NEGATIVE
NITRATE UR QL: NEGATIVE
PH UR STRIP: 6.5 PH (ref 5–7)
SOURCE: NORMAL
SP GR UR STRIP: 1 (ref 1–1.03)
UROBILINOGEN UR STRIP-MCNC: NORMAL MG/DL (ref 0–2)

## 2018-02-07 PROCEDURE — 52000 CYSTOURETHROSCOPY: CPT | Performed by: UROLOGY

## 2018-02-07 PROCEDURE — 81003 URINALYSIS AUTO W/O SCOPE: CPT | Performed by: UROLOGY

## 2018-02-07 NOTE — LETTER
February 12, 2018       TO: Bijan Livingston  [address] [place] [id number]       Dear ,    We are writing to inform you of your test results.    Your test results fall within the expected range(s) or remain unchanged from previous results.  Please continue with current treatment plan.    Resulted Orders   UA reflex to Microscopic and Culture   Result Value Ref Range    Color Urine Light Yellow     Appearance Urine Clear     Glucose Urine Negative NEG^Negative mg/dL    Bilirubin Urine Negative NEG^Negative    Ketones Urine Negative NEG^Negative mg/dL    Specific Gravity Urine 1.004 1.003 - 1.035    Blood Urine Negative NEG^Negative    pH Urine 6.5 5.0 - 7.0 pH    Protein Albumin Urine Negative NEG^Negative mg/dL    Urobilinogen mg/dL Normal 0.0 - 2.0 mg/dL    Nitrite Urine Negative NEG^Negative    Leukocyte Esterase Urine Negative NEG^Negative    Source Midstream Urine        Obed Cooper MD

## 2018-02-07 NOTE — NURSING NOTE
"Bijan Livingston's goals for this visit include:   Chief Complaint   Patient presents with     Procedure     Cysto       He requests these members of his care team be copied on today's visit information: Yes    [facility]    Referring Provider:  No referring provider defined for this encounter.    Chief Complaint   Patient presents with     Procedure     Cysto       Initial /74 (BP Location: Left arm, Patient Position: Sitting, Cuff Size: Adult Regular)  Pulse 80  SpO2 98% Estimated body mass index is 28.63 kg/(m^2) as calculated from the following:    Height as of 1/15/18: 1.854 m (6' 1\").    Weight as of 1/15/18: 98.4 kg (217 lb).  Medication Reconciliation: complete    Do you need any medication refills at today's visit? No    "

## 2018-02-07 NOTE — PATIENT INSTRUCTIONS
"After Your Cystoscopy    What happens after the exam?    You may go back to your normal diet and activity as you feel ready, unless your doctor tells you not to.    For the next two days, you may notice:    Some blood in your urine  Some burning when you urinate (use the toilet)  An urge to urinate more often  Bladder spasms    These are normal after the procedure and should go away after a day or two.  To relieve these problems drink 6 to 8 large glasses of water each day (includes drinks at meals) as this will help clear the urine.  Take warm baths to relieve pain and bladder spasms.  Do not add anything to the bath water.  You may also take Tylenol (acetaminophen) for pain if needed.    When should I call my doctor?    A fever over 100F (38C) for more than a day. (Before you call the doctor, check your temperature under your tongue)  Chills  Failure to urinate: No urine comes out when you try to use the toilet. (Try soaking in a bathtub full of warm water. If still no urine, call your doctor)  A lot of blood in the urine, or blood clots larger than a nickel  Pain in the back or belly area (abdomen)  Pain or spasms that are not relieved by warm tub baths and pain medicine  Severe pain, burning or other problems while passing urine  Pain that gets worse after two days                AFTER YOUR CYSTOSCOPY        You have just completed a cystoscopy, or \"cysto\", which allowed your physician to learn more about your bladder (or to remove a stent placed after surgery). We suggest that you continue to avoid caffeine, fruit juice, and alcohol for the next 24 hours, however, you are encouraged to return to your normal activities.         A few things that are considered normal after your cystoscopy:     * Small amount of bleeding (or spotting) that clears within the next 24 hours     * Slight burning sensation with urination     * Sensation to of needing to avoid more frequently     * The feeling of \"air\" in your " urine     * Mild discomfort that is relieved with Tylenol        Please contact our office promptly if you:     * Develop a fever above 101 degrees     * Are unable to urinate     * Develop bright red blood that does not stop     * Severe pain or swelling         Please contact our office with any concerns or questions @[facility].

## 2018-02-07 NOTE — PROGRESS NOTES
Pre-procedure diagnosis:  Gross hematuria   Post-procedure diagnosis: Normal cystoscopy  Procedure performed:  Cystourethroscopy  Surgeon:    Reji LUNA   Anesthesia:    Local    Indications for procedure:   Bijan Livingston is a 52 year old male with a history of gross painless hematuria, initial.  Renal ultrasound was normal.  History of chronic pelvic pain, penis/testis pain.    Description of procedure:   After fully informed, voluntary consent was obtained, the patient was brought into the procedure room, identified and placed in a supine position on the cysto table.  The groin/scrotum were prepped and draped in a sterile fashion with betadine.  A 15F flexible cystoscope was inserted into the urethra and the bladder and urethra examined in a systematic manner.  The patient tolerated the procedure well and there were no complications.  No ulcers seen in bladder.    Cystoscopic findings:  The urethra was normal without strictures.  The prostate was 3cm long and minimal hypertrophy.  There was no median lobe.  The bladder was completely surveyed.  There was no trabeculation.  There were no tumors, stones, or diverticula identifed.  The ureteric orifices were normal in position and number and effluxing clear urine.    Assessment/Plan:   Bijan Livingston is a 52 year old male with a history of gross hematuria , now with normal findings on cystoscopy.      He still has the pelvic pain.    Recommended.  Pelvic floor PT  Consider see [facility] for possible IC.  Pain is worse with full bladder, improves with emptying.  Advised consider see ortho spine to see if bulging disc can be fixed.  Follow-up with pain clinic also    Reji LUNA

## 2018-02-07 NOTE — MR AVS SNAPSHOT
After Visit Summary   2/7/2018    Bijan Livingston    MRN: [id number]           Patient Information     Date Of Birth          1965        Visit Information        Provider Department      2/7/2018 9:30 AM Obed Cooper MD [facility]        Today's Diagnoses     Hematuria    -  1      Care Instructions    After Your Cystoscopy    What happens after the exam?    You may go back to your normal diet and activity as you feel ready, unless your doctor tells you not to.    For the next two days, you may notice:    Some blood in your urine  Some burning when you urinate (use the toilet)  An urge to urinate more often  Bladder spasms    These are normal after the procedure and should go away after a day or two.  To relieve these problems drink 6 to 8 large glasses of water each day (includes drinks at meals) as this will help clear the urine.  Take warm baths to relieve pain and bladder spasms.  Do not add anything to the bath water.  You may also take Tylenol (acetaminophen) for pain if needed.    When should I call my doctor?    A fever over 100F (38C) for more than a day. (Before you call the doctor, check your temperature under your tongue)  Chills  Failure to urinate: No urine comes out when you try to use the toilet. (Try soaking in a bathtub full of warm water. If still no urine, call your doctor)  A lot of blood in the urine, or blood clots larger than a nickel  Pain in the back or belly area (abdomen)  Pain or spasms that are not relieved by warm tub baths and pain medicine  Severe pain, burning or other problems while passing urine  Pain that gets worse after two days                      Follow-ups after your visit        Who to contact     If you have questions or need follow up information about today's clinic visit or your schedule please contact [facility] directly at [phone].  Normal or non-critical lab and imaging results will be communicated  to you by [facility], letter or phone within 4 business days after the clinic has received the results. If you do not hear from us within 7 days, please contact the clinic through [employer] or phone. If you have a critical or abnormal lab result, we will notify you by phone as soon as possible.  Submit refill requests through [employer] or call your pharmacy and they will forward the refill request to us. Please allow 3 business days for your refill to be completed.          Additional Information About Your Visit        Frogdice Information     F[employer]dice is an electronic gateway that provides easy, online access to your medical records. With F[employer]dice, you can request a clinic appointment, read your test results, renew a prescription or communicate with your care team.     To sign up for [employer] visit the website at [email or web address]   You will be asked to enter the access code listed below, as well as some personal information. Please follow the directions to create your username and password.     Your access code is: [email or web address]  Expires: 4/15/2018 11:54 AM     Your access code will  in 90 days. If you need help or a new code, please contact your [facility] or call [phone] for assistance.        Care EveryWhere ID     This is your Care EveryWhere ID. This could be used by other organizations to access your [place] medical records  [id number]        Your Vitals Were     Pulse Pulse Oximetry                80 98%           Blood Pressure from Last 3 Encounters:   18 122/74   01/15/18 110/79   17 113/80    Weight from Last 3 Encounters:   01/15/18 98.4 kg (217 lb)   17 111.1 kg (245 lb)   17 111.4 kg (245 lb 9.6 oz)              We Performed the Following     UA reflex to Microscopic and Culture        Primary Care Provider Office Phone # Fax #    [facility] [phone]       [address] [place] [address] [place] [address]        Equal Access  to Services     EVE KEMP : Hadii theo Tong, waalexiada luqadaha, qaybta kaalmavalencia pool. So [facility] [phone].    ATENCIÓN: Si habla cristi, tiene a diaz disposición servicios gratuitos de asistencia lingüística. Llame al [phone].    We comply with applicable federal civil rights laws and [place] laws. We do not discriminate on the basis of race, color, national origin, age, disability, sex, sexual orientation, or gender identity.            Thank you!     Thank you for choosing [facility]  for your care. Our goal is always to provide you with excellent care. Hearing back from our patients is one way we can continue to improve our services. Please take a few minutes to complete the written survey that you may receive in the mail after your visit with us. Thank you!             Your Updated Medication List - Protect others around you: Learn how to safely use, store and throw away your medicines at www.disposemymeds.org.          This list is accurate as of 2/7/18 10:32 AM.  Always use your most recent med list.                   Brand Name Dispense Instructions for use Diagnosis    ACETAMINOPHEN PO       Penile pain, Epididymal pain       BENADRYL PO       Penile pain, Epididymal pain       [employer] FIBER GUMMIES PO       Penile pain, Epididymal pain       DILANTIN 100 MG CR capsule   Generic drug:  phenytoin      5 caps every am        fosfomycin 3 G Packet    MONUROL    1 packet    Take 1 packet (3 g) by mouth every 72 hours    Epididymitis       GABAPENTIN PO       Penile pain, Epididymal pain       GLUCOSAMINE SULFATE PO      Take 1,000 mg by mouth daily    Penile pain, Epididymal pain       HYDROcodone-acetaminophen 5-325 MG per tablet    NORCO          IBUPROFEN PO       Penile pain, Epididymal pain       MELATONIN PO       Penile pain, Epididymal pain

## 2021-08-02 NOTE — PATIENT INSTRUCTIONS
Sutures need to be removed in 10 days. Keep wound dry. Return to clinic or Emergency Department for redness spreading from wound, pus drainage or fevers. Change bacitracin dressings daily or when soiled  *LACERATION (All Closures)  A laceration is a cut through the skin. This will usually require stitches (sutures) or staples if it is deep. Minor cuts may be treated with a tape closure ( Steri-Strips ) or Dermabond skin glue  HOME CARE  PAIN MEDICINE: You may use acetaminophen (Tylenol) 650-1000 mg every 6 hours or ibuprofen (Motrin, Advil) 600 mg every 6-8 hours with food to control pain, if you are able to take these medicines. [NOTE: If you have chronic liver or kidney disease or ever had a stomach ulcer or GI bleeding, talk with your doctor before using these medicines.]  EXTREMITY, FACE or TRUNK WOUNDS:  Keep the wound clean and dry. If a bandage was applied and it becomes wet or dirty, replace it. Otherwise, leave it in place for the first 24 hours.  If stitches or staples were used, clean the wound daily. Protect the wound from sunlight and tanning lamps.  After removing the bandage, wash the area with soap and water. Use a wet cotton swab (Q tip) to loosen and remove any blood or crust that forms.  After cleaning, apply a thin layer of Polysporin or Bacitracin ointment. This will keep the wound clean and make it easier to remove the stitches or staples. Reapply a fresh bandage.  You may remove the bandage to shower as usual after the first 24 hours, but do not soak the area in water (no swimming) until the stitches or staples are removed.  If Steri-Strips were used, keep the area clean and dry. If it becomes wet, blot it dry with a towel. It is okay to take a brief shower, but avoid scrubbing the area.  If Dermabond skin adhesive was used, do not scratch, rub or pick at the adhesive film. Do not place tape directly over the film. Do not apply liquid, ointment or creams to the wound while the film is in  place. Do not clean the wound with peroxide and do not apply ointments. Avoid activities that cause heavy sweating until the film has fallen off. Protect the wound from prolonged exposure to sunlight , tanning lamps, or water. Keep it completely dry for 24 hours and  Do not soak the wound in water (no baths or swimming) x 5 days, then begin washing as normal and the film will fall off by itself in 5-10 days.  SCALP WOUNDS: During the first two days, you may carefully rinse your hair in the shower to remove blood, glass or dirt particles. After two days, you may shower and shampoo your hair normally. Do not soak your scalp in the tub or go swimming until the stitches or staples have been removed.  MOUTH WOUNDS: Eat soft foods to reduce pain. If the cut is inside of your mouth, clean by rinsing after each meal and at bedtime with a mixture of equal parts water and Hydrogen Peroxide (do not swallow!). Or, you can use a cotton swab to directly apply Hydrogen Peroxide onto the cut.  FOLLOW UP: Most skin wounds heal within ten days. Mouth and facial wounds heal within five days. However, even with proper treatment, a wound infection may sometimes occur. Therefore, you should check the wound daily for signs of infection listed below.  Stitches should be removed from the face within five days; stitches and staples should be removed from other parts of the body within 7-10 days. Unless you are told to come back to the emergency room, you may have your doctor or urgent care remove the stitches. If dissolving stitches were used in the mouth, these will fall out or dissolve without the need for removal. If tape closures ( Steri-Strips ) were used, remove them yourself if they have not fallen off after 7 days. If Dermabond skin glue was used, the film will fall off by itself in 5-10 days.   GET PROMPT MEDICAL ATTENTION if any of the following occur:  Increasing pain in the wound  Redness, swelling or pus coming from the  wound  Fever over 101 F (38.3 C) oral  If stitches or staples come apart or fall out or if Steri-Strips fall off before seven days  If the wound edges re-open  Bleeding not controlled by direct pressure    [id number] [facility], [address] [place] [address], [place] [address]. All rights reserved. This information is not intended as a substitute for professional medical care. Always follow your healthcare professional's instructions       No